# Patient Record
Sex: FEMALE | Race: WHITE | NOT HISPANIC OR LATINO | Employment: UNEMPLOYED | URBAN - METROPOLITAN AREA
[De-identification: names, ages, dates, MRNs, and addresses within clinical notes are randomized per-mention and may not be internally consistent; named-entity substitution may affect disease eponyms.]

---

## 2018-01-28 ENCOUNTER — OFFICE VISIT (OUTPATIENT)
Dept: URGENT CARE | Facility: PHYSICIAN GROUP | Age: 23
End: 2018-01-28
Payer: COMMERCIAL

## 2018-01-28 ENCOUNTER — HOSPITAL ENCOUNTER (OUTPATIENT)
Facility: MEDICAL CENTER | Age: 23
End: 2018-01-28
Attending: PHYSICIAN ASSISTANT
Payer: COMMERCIAL

## 2018-01-28 VITALS
BODY MASS INDEX: 28.35 KG/M2 | DIASTOLIC BLOOD PRESSURE: 82 MMHG | TEMPERATURE: 97.3 F | RESPIRATION RATE: 16 BRPM | WEIGHT: 160 LBS | SYSTOLIC BLOOD PRESSURE: 112 MMHG | HEIGHT: 63 IN | HEART RATE: 134 BPM | OXYGEN SATURATION: 97 %

## 2018-01-28 DIAGNOSIS — R11.2 NAUSEA AND VOMITING, INTRACTABILITY OF VOMITING NOT SPECIFIED, UNSPECIFIED VOMITING TYPE: Primary | ICD-10-CM

## 2018-01-28 DIAGNOSIS — Z3A.12 12 WEEKS GESTATION OF PREGNANCY: ICD-10-CM

## 2018-01-28 LAB
APPEARANCE UR: NORMAL
BILIRUB UR STRIP-MCNC: NORMAL MG/DL
COLOR UR AUTO: NORMAL
FLUAV+FLUBV AG SPEC QL IA: NORMAL
GLUCOSE UR STRIP.AUTO-MCNC: NORMAL MG/DL
INT CON NEG: NEGATIVE
INT CON POS: POSITIVE
KETONES UR STRIP.AUTO-MCNC: 160 MG/DL
LEUKOCYTE ESTERASE UR QL STRIP.AUTO: NORMAL
NITRITE UR QL STRIP.AUTO: NORMAL
PH UR STRIP.AUTO: 6 [PH] (ref 5–8)
PROT UR QL STRIP: 100 MG/DL
RBC UR QL AUTO: NORMAL
SP GR UR STRIP.AUTO: 1.03
UROBILINOGEN UR STRIP-MCNC: NORMAL MG/DL

## 2018-01-28 PROCEDURE — 87804 INFLUENZA ASSAY W/OPTIC: CPT | Performed by: PHYSICIAN ASSISTANT

## 2018-01-28 PROCEDURE — 87086 URINE CULTURE/COLONY COUNT: CPT

## 2018-01-28 PROCEDURE — 81002 URINALYSIS NONAUTO W/O SCOPE: CPT | Performed by: PHYSICIAN ASSISTANT

## 2018-01-28 PROCEDURE — 99204 OFFICE O/P NEW MOD 45 MIN: CPT | Performed by: PHYSICIAN ASSISTANT

## 2018-01-28 RX ORDER — ONDANSETRON 4 MG/1
4 TABLET, ORALLY DISINTEGRATING ORAL EVERY 6 HOURS PRN
Qty: 10 TAB | Refills: 0 | Status: SHIPPED | OUTPATIENT
Start: 2018-01-28 | End: 2018-01-29

## 2018-01-28 RX ORDER — ONDANSETRON 4 MG/1
4 TABLET, ORALLY DISINTEGRATING ORAL ONCE
Status: COMPLETED | OUTPATIENT
Start: 2018-01-28 | End: 2018-01-28

## 2018-01-28 RX ORDER — ONDANSETRON 4 MG/1
TABLET, FILM COATED ORAL
Refills: 0 | COMMUNITY
Start: 2018-01-17 | End: 2018-01-29

## 2018-01-28 RX ADMIN — ONDANSETRON 4 MG: 4 TABLET, ORALLY DISINTEGRATING ORAL at 12:13

## 2018-01-28 ASSESSMENT — ENCOUNTER SYMPTOMS
DIARRHEA: 0
COUGH: 1
VOMITING: 1
EYES NEGATIVE: 1
NAUSEA: 1
PSYCHIATRIC NEGATIVE: 1
MUSCULOSKELETAL NEGATIVE: 1
NEUROLOGICAL NEGATIVE: 1
ABDOMINAL PAIN: 0
CARDIOVASCULAR NEGATIVE: 1

## 2018-01-28 NOTE — PROGRESS NOTES
"Subjective:      Nery Spicer is a 22 y.o. female who presents with Emesis (nausea, vomiting; 12 weeks pregnant)            HPI  Chief Complaint   Patient presents with   • Emesis     nausea, vomiting; 12 weeks pregnant       HPI:  Nery Spicer is a 22 y.o. female who presents with 12 week pregnancy with n/v.  Patient denies HA, SOB, chest pain, palpitations, fever, chills, or n/v/d.    On vit B6.  Has not tried benadryl.  Did take Zofran last dose today, but threw right after.    This was rx by OB.    First pregnancy.     Mom present in room and had Nausea into second trimister.    No fever.  Having runny nose.  Last 2 days runny nose and more violent vomiting.      No past medical history on file.    No past surgical history on file.    No family history on file.  No pertinent family history.    Social History     Social History   • Marital status:      Spouse name: N/A   • Number of children: N/A   • Years of education: N/A     Occupational History   • Not on file.     Social History Main Topics   • Smoking status: Never Smoker   • Smokeless tobacco: Never Used   • Alcohol use Not on file   • Drug use: Unknown   • Sexual activity: Not on file     Other Topics Concern   • Not on file     Social History Narrative   • No narrative on file         Current Outpatient Prescriptions:   •  ondansetron,     No Known Allergies     Review of Systems   Constitutional: Positive for malaise/fatigue.   HENT: Positive for congestion.    Eyes: Negative.    Respiratory: Positive for cough.    Cardiovascular: Negative.    Gastrointestinal: Positive for nausea and vomiting. Negative for abdominal pain and diarrhea.   Genitourinary: Negative.    Musculoskeletal: Negative.    Skin: Negative.    Neurological: Negative.    Endo/Heme/Allergies: Negative.    Psychiatric/Behavioral: Negative.           Objective:     /82   Pulse (!) 134   Temp 36.3 °C (97.3 °F)   Resp 16   Ht 1.6 m (5' 3\")   Wt 72.6 kg (160 lb)   " SpO2 97%   BMI 28.34 kg/m²      Physical Exam       Nursing note and vitals reviewed.    Constitutional:  Appropriately groomed, pleasant affect, and in no acute distress.    Head: normocephalic and atraumatic.    Eye:   PERRLA, EOM's full, sclera white, no scleral icterus, conjunctiva not erythematous, and medial canthus without exudate bilaterally.    Ears:  Hearing grossly intact to voice.    Throat:  Oropharynx midly erythematous, with no enlargement of the palatine tonsils bilaterally with no exudates.    Posterior oropharynx with clear post nasal drainage present.  Soft palate rises symmetrically bilaterally and uvula midline.  Neck supple, with mild proximal anterior cervical chain lymphadenopathy that is soft and mobile to palpation.  Thyroid non-palpable.  No supraclavicular lymphadenopathy.    Lungs:  Lungs with normal respiratory excursion and effort. Lungs clear to auscultation bilaterally without wheezes rales or rhonchi.    Heart:  RRR, without murmurs, rubs, or gallops.  Carotid arteries without bruits bilaterally.  Radial and dorsalis pedis pulses 2+ bilaterally    Abdomen:  Protuberant without striations, ecchymosis, or lesions.  Bowel sounds present all 4Qs.  Normotympanic to percussion all 4Qs.  No  TTP.  No masses to palpation.  No hepatosplenomegaly, no TTP at McBurney's point, negative Gannon's sign, no rebound tenderness, and no guarding.  No CVA tenderness bilaterally.    MSK:  Gait and station wnl, non antalgic.    Derm:  No rashes or lesions with good turgor pressure.     Psychiatric:  Normal judgement, mood and affect.       Assessment/Plan:     1. Nausea and vomiting, intractability of vomiting not specified, unspecified vomiting type  ondansetron (ZOFRAN ODT) dispertab 4 mg    POCT Urinalysis    POCT Influenza A/B   2. 12 weeks gestation of pregnancy  ondansetron (ZOFRAN ODT) dispertab 4 mg    POCT Urinalysis    POCT Influenza A/B      Patient presents with nausea and vomiting that has  been worse over the last 2 days. Associate with runny nose and cough. Rapid flu negative. Urine with ketones and protein consistent with history of poor food intake and leukocyte esterase. Sent urine for culture for further evaluation to rule out urinary tract infection. Post Zofran ODT patient did have improvement with fluids and was able tolerate in clinic. She has been reporting 3-4 episodes of emesis daily which is increased from her normal and 2. Mother present in room also can attest to significant nausea and vomiting during pregnancy that lasted into second trimester. Patient is currently taking vitamin B6 which I advised her to continue with trial of Unisom over-the-counter and Zofran ODT if not improving. She has been prescribed this by her OB. I did advise that Zofran may have some possibilities for teratogenic effects and to use sparingly. Advised trial of Unisom (half tab) and B6 and lou. Obviously, if patient becomes significantly dehydrated this is more of a risk than poorly linked effects of Zofran with teratogenic effects.  ER if symptoms worsen.    Patient was in agreement with this treatment plan and seemed to understand without barriers. All questions were encouraged and answered.  Reviewed signs and symptoms of when to seek emergency medical care.     Please note that this dictation was created using voice recognition software.  I have made every reasonable attempt to correct obvious errors, but I expect there are errors of chantell and possibly content that I did not discover before finalizing the note.

## 2018-01-28 NOTE — PATIENT INSTRUCTIONS
Unisom: half tablet 1-2 times a day.  Continue with vitamin B6.  Geovanna chews and lozenges or tea.  Avoid stomach to be completely empty or too full.  Zofran under the tongue if not improving.      Morning Sickness  Morning sickness is when you feel sick to your stomach (nauseous) during pregnancy. This nauseous feeling may or may not come with vomiting. It often occurs in the morning but can be a problem any time of day. Morning sickness is most common during the first trimester, but it may continue throughout pregnancy. While morning sickness is unpleasant, it is usually harmless unless you develop severe and continual vomiting (hyperemesis gravidarum). This condition requires more intense treatment.   CAUSES   The cause of morning sickness is not completely known but seems to be related to normal hormonal changes that occur in pregnancy.  RISK FACTORS  You are at greater risk if you:  · Experienced nausea or vomiting before your pregnancy.  · Had morning sickness during a previous pregnancy.  · Are pregnant with more than one baby, such as twins.  TREATMENT   Do not use any medicines (prescription, over-the-counter, or herbal) for morning sickness without first talking to your health care provider. Your health care provider may prescribe or recommend:  · Vitamin B6 supplements.  · Anti-nausea medicines.  · The herbal medicine geovanna.  HOME CARE INSTRUCTIONS   · Only take over-the-counter or prescription medicines as directed by your health care provider.  · Taking multivitamins before getting pregnant can prevent or decrease the severity of morning sickness in most women.  · Eat a piece of dry toast or unsalted crackers before getting out of bed in the morning.  · Eat five or six small meals a day.  · Eat dry and bland foods (rice, baked potato). Foods high in carbohydrates are often helpful.  · Do not drink liquids with your meals. Drink liquids between meals.  · Avoid greasy, fatty, and spicy foods.  · Get  someone to cook for you if the smell of any food causes nausea and vomiting.  · If you feel nauseous after taking prenatal vitamins, take the vitamins at night or with a snack.   · Snack on protein foods (nuts, yogurt, cheese) between meals if you are hungry.  · Eat unsweetened gelatins for desserts.  · Wearing an acupressure wristband (worn for sea sickness) may be helpful.  · Acupuncture may be helpful.  · Do not smoke.  · Get a humidifier to keep the air in your house free of odors.  · Get plenty of fresh air.  SEEK MEDICAL CARE IF:   · Your home remedies are not working, and you need medicine.  · You feel dizzy or lightheaded.  · You are losing weight.  SEEK IMMEDIATE MEDICAL CARE IF:   · You have persistent and uncontrolled nausea and vomiting.  · You pass out (faint).  MAKE SURE YOU:  · Understand these instructions.  · Will watch your condition.  · Will get help right away if you are not doing well or get worse.     This information is not intended to replace advice given to you by your health care provider. Make sure you discuss any questions you have with your health care provider.     Document Released: 02/08/2008 Document Revised: 12/23/2014 Document Reviewed: 06/04/2014  Elsevier Interactive Patient Education ©2016 [employer]

## 2018-01-29 ENCOUNTER — HOSPITAL ENCOUNTER (INPATIENT)
Facility: MEDICAL CENTER | Age: 23
LOS: 8 days | DRG: 781 | End: 2018-02-07
Attending: EMERGENCY MEDICINE | Admitting: HOSPITALIST
Payer: COMMERCIAL

## 2018-01-29 DIAGNOSIS — E86.0 DEHYDRATION: ICD-10-CM

## 2018-01-29 DIAGNOSIS — O21.0 HYPEREMESIS GRAVIDARUM: ICD-10-CM

## 2018-01-29 DIAGNOSIS — Z3A.12 12 WEEKS GESTATION OF PREGNANCY: ICD-10-CM

## 2018-01-29 DIAGNOSIS — R11.2 NAUSEA AND VOMITING, INTRACTABILITY OF VOMITING NOT SPECIFIED, UNSPECIFIED VOMITING TYPE: ICD-10-CM

## 2018-01-29 DIAGNOSIS — Z34.91 FIRST TRIMESTER PREGNANCY: ICD-10-CM

## 2018-01-29 DIAGNOSIS — E87.29 HIGH ANION GAP METABOLIC ACIDOSIS: ICD-10-CM

## 2018-01-29 DIAGNOSIS — N39.0 ACUTE UTI: ICD-10-CM

## 2018-01-29 LAB
ALBUMIN SERPL BCP-MCNC: 5 G/DL (ref 3.2–4.9)
ALBUMIN/GLOB SERPL: 1.6 G/DL
ALP SERPL-CCNC: 68 U/L (ref 30–99)
ALT SERPL-CCNC: 31 U/L (ref 2–50)
ANION GAP SERPL CALC-SCNC: 14 MMOL/L (ref 0–11.9)
ANION GAP SERPL CALC-SCNC: 17 MMOL/L (ref 0–11.9)
APPEARANCE UR: ABNORMAL
AST SERPL-CCNC: 30 U/L (ref 12–45)
BACTERIA #/AREA URNS HPF: ABNORMAL /HPF
BASOPHILS # BLD AUTO: 0.3 % (ref 0–1.8)
BASOPHILS # BLD: 0.03 K/UL (ref 0–0.12)
BILIRUB SERPL-MCNC: 0.9 MG/DL (ref 0.1–1.5)
BILIRUB UR QL STRIP.AUTO: ABNORMAL
BUN SERPL-MCNC: 6 MG/DL (ref 8–22)
BUN SERPL-MCNC: 7 MG/DL (ref 8–22)
CALCIUM SERPL-MCNC: 10.4 MG/DL (ref 8.5–10.5)
CALCIUM SERPL-MCNC: 8.6 MG/DL (ref 8.5–10.5)
CHLORIDE SERPL-SCNC: 106 MMOL/L (ref 96–112)
CHLORIDE SERPL-SCNC: 113 MMOL/L (ref 96–112)
CO2 SERPL-SCNC: 14 MMOL/L (ref 20–33)
CO2 SERPL-SCNC: 15 MMOL/L (ref 20–33)
COLOR UR: ABNORMAL
CREAT SERPL-MCNC: 0.41 MG/DL (ref 0.5–1.4)
CREAT SERPL-MCNC: 0.56 MG/DL (ref 0.5–1.4)
CULTURE IF INDICATED INDCX: YES UA CULTURE
EOSINOPHIL # BLD AUTO: 0.01 K/UL (ref 0–0.51)
EOSINOPHIL NFR BLD: 0.1 % (ref 0–6.9)
EPI CELLS #/AREA URNS HPF: ABNORMAL /HPF
ERYTHROCYTE [DISTWIDTH] IN BLOOD BY AUTOMATED COUNT: 50.1 FL (ref 35.9–50)
GLOBULIN SER CALC-MCNC: 3.1 G/DL (ref 1.9–3.5)
GLUCOSE SERPL-MCNC: 95 MG/DL (ref 65–99)
GLUCOSE SERPL-MCNC: 95 MG/DL (ref 65–99)
GLUCOSE UR STRIP.AUTO-MCNC: NEGATIVE MG/DL
HCT VFR BLD AUTO: 49.3 % (ref 37–47)
HGB BLD-MCNC: 16.1 G/DL (ref 12–16)
HYALINE CASTS #/AREA URNS LPF: ABNORMAL /LPF
IMM GRANULOCYTES # BLD AUTO: 0.07 K/UL (ref 0–0.11)
IMM GRANULOCYTES NFR BLD AUTO: 0.6 % (ref 0–0.9)
KETONES UR STRIP.AUTO-MCNC: >=160 MG/DL
LEUKOCYTE ESTERASE UR QL STRIP.AUTO: ABNORMAL
LIPASE SERPL-CCNC: 68 U/L (ref 11–82)
LYMPHOCYTES # BLD AUTO: 0.91 K/UL (ref 1–4.8)
LYMPHOCYTES NFR BLD: 8.4 % (ref 22–41)
MCH RBC QN AUTO: 26.1 PG (ref 27–33)
MCHC RBC AUTO-ENTMCNC: 32.7 G/DL (ref 33.6–35)
MCV RBC AUTO: 80 FL (ref 81.4–97.8)
MICRO URNS: ABNORMAL
MONOCYTES # BLD AUTO: 0.57 K/UL (ref 0–0.85)
MONOCYTES NFR BLD AUTO: 5.2 % (ref 0–13.4)
NEUTROPHILS # BLD AUTO: 9.29 K/UL (ref 2–7.15)
NEUTROPHILS NFR BLD: 85.4 % (ref 44–72)
NITRITE UR QL STRIP.AUTO: NEGATIVE
NRBC # BLD AUTO: 0 K/UL
NRBC BLD-RTO: 0 /100 WBC
NUMBER OF RH DOSES IND 8505RD: NORMAL
PH UR STRIP.AUTO: 5.5 [PH]
PLATELET # BLD AUTO: 310 K/UL (ref 164–446)
PMV BLD AUTO: 11.1 FL (ref 9–12.9)
POTASSIUM SERPL-SCNC: 3.5 MMOL/L (ref 3.6–5.5)
POTASSIUM SERPL-SCNC: 4.1 MMOL/L (ref 3.6–5.5)
PROT SERPL-MCNC: 8.1 G/DL (ref 6–8.2)
PROT UR QL STRIP: 300 MG/DL
RBC # BLD AUTO: 6.16 M/UL (ref 4.2–5.4)
RBC # URNS HPF: ABNORMAL /HPF
RBC UR QL AUTO: ABNORMAL
RH BLD: NORMAL
SODIUM SERPL-SCNC: 137 MMOL/L (ref 135–145)
SODIUM SERPL-SCNC: 142 MMOL/L (ref 135–145)
SP GR UR STRIP.AUTO: 1.03
UROBILINOGEN UR STRIP.AUTO-MCNC: 1 MG/DL
WBC # BLD AUTO: 10.9 K/UL (ref 4.8–10.8)
WBC #/AREA URNS HPF: ABNORMAL /HPF

## 2018-01-29 PROCEDURE — 99285 EMERGENCY DEPT VISIT HI MDM: CPT

## 2018-01-29 PROCEDURE — 700105 HCHG RX REV CODE 258: Performed by: EMERGENCY MEDICINE

## 2018-01-29 PROCEDURE — 80053 COMPREHEN METABOLIC PANEL: CPT

## 2018-01-29 PROCEDURE — 87086 URINE CULTURE/COLONY COUNT: CPT

## 2018-01-29 PROCEDURE — 36415 COLL VENOUS BLD VENIPUNCTURE: CPT

## 2018-01-29 PROCEDURE — 83690 ASSAY OF LIPASE: CPT

## 2018-01-29 PROCEDURE — 80048 BASIC METABOLIC PNL TOTAL CA: CPT

## 2018-01-29 PROCEDURE — 85025 COMPLETE CBC W/AUTO DIFF WBC: CPT

## 2018-01-29 PROCEDURE — 96375 TX/PRO/DX INJ NEW DRUG ADDON: CPT

## 2018-01-29 PROCEDURE — 96361 HYDRATE IV INFUSION ADD-ON: CPT

## 2018-01-29 PROCEDURE — 96374 THER/PROPH/DIAG INJ IV PUSH: CPT

## 2018-01-29 PROCEDURE — 81001 URINALYSIS AUTO W/SCOPE: CPT

## 2018-01-29 PROCEDURE — 700111 HCHG RX REV CODE 636 W/ 250 OVERRIDE (IP): Performed by: EMERGENCY MEDICINE

## 2018-01-29 PROCEDURE — 86901 BLOOD TYPING SEROLOGIC RH(D): CPT

## 2018-01-29 RX ORDER — ONDANSETRON 2 MG/ML
4 INJECTION INTRAMUSCULAR; INTRAVENOUS ONCE
Status: COMPLETED | OUTPATIENT
Start: 2018-01-29 | End: 2018-01-29

## 2018-01-29 RX ORDER — PROMETHAZINE HYDROCHLORIDE 25 MG/1
25 SUPPOSITORY RECTAL EVERY 8 HOURS PRN
Qty: 15 SUPPOSITORY | Refills: 0 | Status: SHIPPED | OUTPATIENT
Start: 2018-01-29 | End: 2018-02-12

## 2018-01-29 RX ORDER — NITROFURANTOIN 25; 75 MG/1; MG/1
100 CAPSULE ORAL 2 TIMES DAILY
Qty: 20 CAP | Refills: 0 | Status: SHIPPED | OUTPATIENT
Start: 2018-01-29 | End: 2018-02-07

## 2018-01-29 RX ORDER — DEXTROSE MONOHYDRATE, SODIUM CHLORIDE, AND POTASSIUM CHLORIDE 50; 1.49; 2.25 G/1000ML; G/1000ML; G/1000ML
INJECTION, SOLUTION INTRAVENOUS CONTINUOUS
Status: DISCONTINUED | OUTPATIENT
Start: 2018-01-29 | End: 2018-01-30

## 2018-01-29 RX ORDER — PROMETHAZINE HYDROCHLORIDE 25 MG/1
25 TABLET ORAL EVERY 6 HOURS PRN
Qty: 30 TAB | Refills: 0 | Status: SHIPPED | OUTPATIENT
Start: 2018-01-29 | End: 2018-02-07

## 2018-01-29 RX ORDER — CEFTRIAXONE 1 G/1
1 INJECTION, POWDER, FOR SOLUTION INTRAMUSCULAR; INTRAVENOUS ONCE
Status: COMPLETED | OUTPATIENT
Start: 2018-01-29 | End: 2018-01-29

## 2018-01-29 RX ORDER — SODIUM CHLORIDE 9 MG/ML
1000 INJECTION, SOLUTION INTRAVENOUS ONCE
Status: COMPLETED | OUTPATIENT
Start: 2018-01-29 | End: 2018-01-29

## 2018-01-29 RX ADMIN — SODIUM CHLORIDE 1000 ML: 9 INJECTION, SOLUTION INTRAVENOUS at 21:29

## 2018-01-29 RX ADMIN — ONDANSETRON 4 MG: 2 INJECTION INTRAMUSCULAR; INTRAVENOUS at 21:06

## 2018-01-29 RX ADMIN — SODIUM CHLORIDE 1000 ML: 9 INJECTION, SOLUTION INTRAVENOUS at 20:07

## 2018-01-29 RX ADMIN — CEFTRIAXONE SODIUM 1 G: 1 INJECTION, POWDER, FOR SOLUTION INTRAMUSCULAR; INTRAVENOUS at 21:06

## 2018-01-29 ASSESSMENT — LIFESTYLE VARIABLES: DO YOU DRINK ALCOHOL: NO

## 2018-01-30 ENCOUNTER — RESOLUTE PROFESSIONAL BILLING HOSPITAL PROF FEE (OUTPATIENT)
Dept: HOSPITALIST | Facility: MEDICAL CENTER | Age: 23
End: 2018-01-30
Payer: COMMERCIAL

## 2018-01-30 PROBLEM — R11.2 INTRACTABLE NAUSEA AND VOMITING: Status: ACTIVE | Noted: 2018-01-30

## 2018-01-30 PROBLEM — E87.20 METABOLIC ACIDOSIS: Status: ACTIVE | Noted: 2018-01-30

## 2018-01-30 PROBLEM — E87.6 HYPOKALEMIA: Status: ACTIVE | Noted: 2018-01-30

## 2018-01-30 PROBLEM — Z34.90 PREGNANCY: Status: ACTIVE | Noted: 2018-01-30

## 2018-01-30 PROBLEM — O21.0 HYPEREMESIS GRAVIDARUM: Status: ACTIVE | Noted: 2018-01-30

## 2018-01-30 PROBLEM — D64.9 NORMOCYTIC ANEMIA: Status: ACTIVE | Noted: 2018-01-30

## 2018-01-30 PROBLEM — O23.40 UTI (URINARY TRACT INFECTION) DURING PREGNANCY: Status: ACTIVE | Noted: 2018-01-30

## 2018-01-30 PROBLEM — N39.0 URINARY TRACT INFECTION: Status: ACTIVE | Noted: 2018-01-30

## 2018-01-30 PROBLEM — R82.90 ABNORMAL URINALYSIS: Status: ACTIVE | Noted: 2018-01-30

## 2018-01-30 PROBLEM — E86.1 HYPOVOLEMIA DEHYDRATION: Status: ACTIVE | Noted: 2018-01-30

## 2018-01-30 LAB
APPEARANCE UR: CLEAR
BACTERIA #/AREA URNS HPF: ABNORMAL /HPF
BACTERIA UR CULT: NORMAL
BILIRUB UR QL STRIP.AUTO: NEGATIVE
COLOR UR: YELLOW
EPI CELLS #/AREA URNS HPF: ABNORMAL /HPF
EST. AVERAGE GLUCOSE BLD GHB EST-MCNC: 103 MG/DL
GLUCOSE UR STRIP.AUTO-MCNC: 100 MG/DL
HBA1C MFR BLD: 5.2 % (ref 0–5.6)
HYALINE CASTS #/AREA URNS LPF: ABNORMAL /LPF
KETONES UR STRIP.AUTO-MCNC: 80 MG/DL
LEUKOCYTE ESTERASE UR QL STRIP.AUTO: ABNORMAL
MAGNESIUM SERPL-MCNC: 1.5 MG/DL (ref 1.5–2.5)
MICRO URNS: ABNORMAL
NITRITE UR QL STRIP.AUTO: NEGATIVE
PH UR STRIP.AUTO: 5 [PH]
POTASSIUM SERPL-SCNC: 4 MMOL/L (ref 3.6–5.5)
PROT UR QL STRIP: NEGATIVE MG/DL
RBC # URNS HPF: ABNORMAL /HPF
RBC UR QL AUTO: NEGATIVE
SIGNIFICANT IND 70042: NORMAL
SITE SITE: NORMAL
SOURCE SOURCE: NORMAL
SP GR UR STRIP.AUTO: 1.01
UROBILINOGEN UR STRIP.AUTO-MCNC: 1 MG/DL
WBC #/AREA URNS HPF: ABNORMAL /HPF

## 2018-01-30 PROCEDURE — 700101 HCHG RX REV CODE 250: Performed by: EMERGENCY MEDICINE

## 2018-01-30 PROCEDURE — 96365 THER/PROPH/DIAG IV INF INIT: CPT

## 2018-01-30 PROCEDURE — 90471 IMMUNIZATION ADMIN: CPT

## 2018-01-30 PROCEDURE — 96376 TX/PRO/DX INJ SAME DRUG ADON: CPT

## 2018-01-30 PROCEDURE — 700101 HCHG RX REV CODE 250: Performed by: INTERNAL MEDICINE

## 2018-01-30 PROCEDURE — 99223 1ST HOSP IP/OBS HIGH 75: CPT | Performed by: INTERNAL MEDICINE

## 2018-01-30 PROCEDURE — 96366 THER/PROPH/DIAG IV INF ADDON: CPT

## 2018-01-30 PROCEDURE — 700102 HCHG RX REV CODE 250 W/ 637 OVERRIDE(OP): Performed by: INTERNAL MEDICINE

## 2018-01-30 PROCEDURE — 83036 HEMOGLOBIN GLYCOSYLATED A1C: CPT

## 2018-01-30 PROCEDURE — 3E0234Z INTRODUCTION OF SERUM, TOXOID AND VACCINE INTO MUSCLE, PERCUTANEOUS APPROACH: ICD-10-PCS | Performed by: HOSPITALIST

## 2018-01-30 PROCEDURE — 770020 HCHG ROOM/CARE - TELE (206)

## 2018-01-30 PROCEDURE — 700111 HCHG RX REV CODE 636 W/ 250 OVERRIDE (IP): Performed by: INTERNAL MEDICINE

## 2018-01-30 PROCEDURE — 81001 URINALYSIS AUTO W/SCOPE: CPT

## 2018-01-30 PROCEDURE — A9270 NON-COVERED ITEM OR SERVICE: HCPCS | Performed by: INTERNAL MEDICINE

## 2018-01-30 PROCEDURE — 96375 TX/PRO/DX INJ NEW DRUG ADDON: CPT

## 2018-01-30 PROCEDURE — 700105 HCHG RX REV CODE 258: Performed by: INTERNAL MEDICINE

## 2018-01-30 PROCEDURE — 84132 ASSAY OF SERUM POTASSIUM: CPT

## 2018-01-30 PROCEDURE — 700102 HCHG RX REV CODE 250 W/ 637 OVERRIDE(OP): Performed by: HOSPITALIST

## 2018-01-30 PROCEDURE — G0378 HOSPITAL OBSERVATION PER HR: HCPCS

## 2018-01-30 PROCEDURE — A9270 NON-COVERED ITEM OR SERVICE: HCPCS | Performed by: HOSPITALIST

## 2018-01-30 PROCEDURE — 700101 HCHG RX REV CODE 250: Performed by: HOSPITALIST

## 2018-01-30 PROCEDURE — 700111 HCHG RX REV CODE 636 W/ 250 OVERRIDE (IP): Performed by: HOSPITALIST

## 2018-01-30 PROCEDURE — 90686 IIV4 VACC NO PRSV 0.5 ML IM: CPT | Performed by: HOSPITALIST

## 2018-01-30 PROCEDURE — 83735 ASSAY OF MAGNESIUM: CPT

## 2018-01-30 RX ORDER — DEXTROSE AND SODIUM CHLORIDE 5; .9 G/100ML; G/100ML
INJECTION, SOLUTION INTRAVENOUS CONTINUOUS
Status: DISCONTINUED | OUTPATIENT
Start: 2018-01-30 | End: 2018-01-30

## 2018-01-30 RX ORDER — MORPHINE SULFATE 4 MG/ML
2 INJECTION, SOLUTION INTRAMUSCULAR; INTRAVENOUS
Status: DISCONTINUED | OUTPATIENT
Start: 2018-01-30 | End: 2018-02-07 | Stop reason: HOSPADM

## 2018-01-30 RX ORDER — ONDANSETRON 4 MG/1
4 TABLET, ORALLY DISINTEGRATING ORAL EVERY 4 HOURS PRN
Status: DISCONTINUED | OUTPATIENT
Start: 2018-01-30 | End: 2018-02-07 | Stop reason: HOSPADM

## 2018-01-30 RX ORDER — PROMETHAZINE HYDROCHLORIDE 25 MG/1
12.5-25 TABLET ORAL EVERY 4 HOURS PRN
Status: DISCONTINUED | OUTPATIENT
Start: 2018-01-30 | End: 2018-01-30

## 2018-01-30 RX ORDER — ACETAMINOPHEN 325 MG/1
650 TABLET ORAL EVERY 6 HOURS PRN
Status: DISCONTINUED | OUTPATIENT
Start: 2018-01-30 | End: 2018-02-07 | Stop reason: HOSPADM

## 2018-01-30 RX ORDER — MORPHINE SULFATE 4 MG/ML
1 INJECTION, SOLUTION INTRAMUSCULAR; INTRAVENOUS ONCE
Status: COMPLETED | OUTPATIENT
Start: 2018-01-30 | End: 2018-01-30

## 2018-01-30 RX ORDER — OXYCODONE HYDROCHLORIDE 5 MG/1
2.5 TABLET ORAL
Status: DISCONTINUED | OUTPATIENT
Start: 2018-01-30 | End: 2018-02-07 | Stop reason: HOSPADM

## 2018-01-30 RX ORDER — CEPHALEXIN 500 MG/1
500 CAPSULE ORAL 4 TIMES DAILY
Status: DISCONTINUED | OUTPATIENT
Start: 2018-01-30 | End: 2018-01-31

## 2018-01-30 RX ORDER — ONDANSETRON 2 MG/ML
4 INJECTION INTRAMUSCULAR; INTRAVENOUS EVERY 4 HOURS PRN
Status: DISCONTINUED | OUTPATIENT
Start: 2018-01-30 | End: 2018-02-07 | Stop reason: HOSPADM

## 2018-01-30 RX ORDER — ZOLPIDEM TARTRATE 5 MG/1
5 TABLET ORAL
Status: DISCONTINUED | OUTPATIENT
Start: 2018-01-30 | End: 2018-02-07 | Stop reason: HOSPADM

## 2018-01-30 RX ORDER — PROMETHAZINE HYDROCHLORIDE 12.5 MG/1
12.5-25 SUPPOSITORY RECTAL EVERY 4 HOURS PRN
Status: DISCONTINUED | OUTPATIENT
Start: 2018-01-30 | End: 2018-01-30

## 2018-01-30 RX ORDER — DEXTROSE MONOHYDRATE, SODIUM CHLORIDE, SODIUM LACTATE, POTASSIUM CHLORIDE, CALCIUM CHLORIDE 5; 600; 310; 179; 20 G/100ML; MG/100ML; MG/100ML; MG/100ML; MG/100ML
INJECTION, SOLUTION INTRAVENOUS CONTINUOUS
Status: DISCONTINUED | OUTPATIENT
Start: 2018-01-30 | End: 2018-01-30

## 2018-01-30 RX ORDER — PYRIDOXINE HCL (VITAMIN B6) 50 MG
50 TABLET ORAL DAILY
COMMUNITY

## 2018-01-30 RX ORDER — POLYETHYLENE GLYCOL 3350 17 G/17G
1 POWDER, FOR SOLUTION ORAL
Status: DISCONTINUED | OUTPATIENT
Start: 2018-01-30 | End: 2018-02-07 | Stop reason: HOSPADM

## 2018-01-30 RX ORDER — AMOXICILLIN 250 MG
2 CAPSULE ORAL 2 TIMES DAILY
Status: DISCONTINUED | OUTPATIENT
Start: 2018-01-30 | End: 2018-02-07 | Stop reason: HOSPADM

## 2018-01-30 RX ORDER — PROMETHAZINE HYDROCHLORIDE 25 MG/1
25 SUPPOSITORY RECTAL EVERY 6 HOURS PRN
Status: DISCONTINUED | OUTPATIENT
Start: 2018-01-30 | End: 2018-02-07 | Stop reason: HOSPADM

## 2018-01-30 RX ORDER — BISACODYL 10 MG
10 SUPPOSITORY, RECTAL RECTAL
Status: DISCONTINUED | OUTPATIENT
Start: 2018-01-30 | End: 2018-02-07 | Stop reason: HOSPADM

## 2018-01-30 RX ORDER — ONDANSETRON 4 MG/1
4 TABLET, ORALLY DISINTEGRATING ORAL EVERY 6 HOURS PRN
Status: ON HOLD | COMMUNITY
End: 2018-02-18

## 2018-01-30 RX ORDER — OXYCODONE HYDROCHLORIDE 5 MG/1
5 TABLET ORAL
Status: DISCONTINUED | OUTPATIENT
Start: 2018-01-30 | End: 2018-02-07 | Stop reason: HOSPADM

## 2018-01-30 RX ORDER — CLONIDINE HYDROCHLORIDE 0.1 MG/1
0.1 TABLET ORAL EVERY 6 HOURS PRN
Status: DISCONTINUED | OUTPATIENT
Start: 2018-01-30 | End: 2018-02-07 | Stop reason: HOSPADM

## 2018-01-30 RX ADMIN — POTASSIUM CHLORIDE: 2 INJECTION, SOLUTION, CONCENTRATE INTRAVENOUS at 09:21

## 2018-01-30 RX ADMIN — DEXTROSE MONOHYDRATE, SODIUM CHLORIDE, SODIUM LACTATE, POTASSIUM CHLORIDE, CALCIUM CHLORIDE: 5; 600; 310; 179; 20 INJECTION, SOLUTION INTRAVENOUS at 04:15

## 2018-01-30 RX ADMIN — ONDANSETRON 4 MG: 2 INJECTION INTRAMUSCULAR; INTRAVENOUS at 07:28

## 2018-01-30 RX ADMIN — DEXTROSE MONOHYDRATE, SODIUM CHLORIDE, AND POTASSIUM CHLORIDE 1000 ML: 50; 2.25; 1.49 INJECTION, SOLUTION INTRAVENOUS at 00:34

## 2018-01-30 RX ADMIN — PROMETHAZINE HYDROCHLORIDE 25 MG: 12.5 SUPPOSITORY RECTAL at 16:14

## 2018-01-30 RX ADMIN — INFLUENZA A VIRUS A/MICHIGAN/45/2015 X-275 (H1N1) ANTIGEN (FORMALDEHYDE INACTIVATED), INFLUENZA A VIRUS A/HONG KONG/4801/2014 X-263B (H3N2) ANTIGEN (FORMALDEHYDE INACTIVATED), INFLUENZA B VIRUS B/PHUKET/3073/2013 ANTIGEN (FORMALDEHYDE INACTIVATED), AND INFLUENZA B VIRUS B/BRISBANE/60/2008 ANTIGEN (FORMALDEHYDE INACTIVATED) 0.5 ML: 15; 15; 15; 15 INJECTION, SUSPENSION INTRAMUSCULAR at 12:54

## 2018-01-30 RX ADMIN — CEPHALEXIN 500 MG: 500 CAPSULE ORAL at 18:48

## 2018-01-30 RX ADMIN — ONDANSETRON 4 MG: 2 INJECTION INTRAMUSCULAR; INTRAVENOUS at 12:54

## 2018-01-30 RX ADMIN — STANDARDIZED SENNA CONCENTRATE AND DOCUSATE SODIUM 2 TABLET: 8.6; 5 TABLET, FILM COATED ORAL at 20:54

## 2018-01-30 RX ADMIN — CEPHALEXIN 500 MG: 500 CAPSULE ORAL at 20:54

## 2018-01-30 RX ADMIN — MORPHINE SULFATE 1 MG: 4 INJECTION INTRAVENOUS at 13:10

## 2018-01-30 RX ADMIN — PROMETHAZINE HYDROCHLORIDE 25 MG: 12.5 SUPPOSITORY RECTAL at 09:23

## 2018-01-30 ASSESSMENT — ENCOUNTER SYMPTOMS
ABDOMINAL PAIN: 0
LOSS OF CONSCIOUSNESS: 0
INSOMNIA: 0
FALLS: 0
FOCAL WEAKNESS: 0
CONSTIPATION: 0
WHEEZING: 0
EYE PAIN: 0
CHILLS: 0
ABDOMINAL PAIN: 1
WEAKNESS: 1
TREMORS: 0
DOUBLE VISION: 0
SEIZURES: 0
MYALGIAS: 0
BLOOD IN STOOL: 0
HEMOPTYSIS: 0
NERVOUS/ANXIOUS: 0
SPUTUM PRODUCTION: 0
PALPITATIONS: 0
NAUSEA: 1
EYE REDNESS: 0
WEAKNESS: 0
COUGH: 0
FEVER: 0
SHORTNESS OF BREATH: 0
HEADACHES: 0
DIARRHEA: 0
DIZZINESS: 0
VOMITING: 1
BLURRED VISION: 0

## 2018-01-30 ASSESSMENT — PAIN SCALES - GENERAL
PAINLEVEL_OUTOF10: 0
PAINLEVEL_OUTOF10: 10

## 2018-01-30 ASSESSMENT — PATIENT HEALTH QUESTIONNAIRE - PHQ9
1. LITTLE INTEREST OR PLEASURE IN DOING THINGS: NOT AT ALL
2. FEELING DOWN, DEPRESSED, IRRITABLE, OR HOPELESS: NOT AT ALL
SUM OF ALL RESPONSES TO PHQ9 QUESTIONS 1 AND 2: 0
SUM OF ALL RESPONSES TO PHQ QUESTIONS 1-9: 0

## 2018-01-30 ASSESSMENT — LIFESTYLE VARIABLES
EVER_SMOKED: NEVER
ALCOHOL_USE: NO

## 2018-01-30 NOTE — PROGRESS NOTES
"Pt c/o 10/10 chest pain, \"when I hiccup.\" Discussed pt's care with Hospitalist. Pt medicated with 1 mg IV morphine x 1.   "

## 2018-01-30 NOTE — PROGRESS NOTES
Received care of pt from NOC RN this am. Pt is A+O. Emesis x 2 this am. Medicated w/ IV PRN Zofran. Discussed pt's care with Hospitalist.

## 2018-01-30 NOTE — ASSESSMENT & PLAN NOTE
1st pregnancy. Update outpatient Dr. Treva turner [phone]    Abd US 2/3/18-  Single intrauterine pregnancy of an estimated gestational age of 13 weeks, 2 days with an estimated date of delivery of 8/8/2018. No clinical AMINA is noted.  Fetal survey within normal limits.  No perigestational hemorrhage or free fluid.

## 2018-01-30 NOTE — H&P
Hospital Medicine History and Physical    Date of Service  2018    Chief Complaint  Chief Complaint   Patient presents with   • N/V     pt has had n/v onset approx 5 days ago and worsening every day since.  Pt was seen at Urgent Care yesterday and given RX for zofran which is not helping at all.   • Pregnancy     LMP 17,  AMINA 19,         History of Presenting Illness  22 y.o. female with no prior medical history but recently gravid, reports that since she has found out about her pregnancy, she has had persistent nausea and vomiting. [place] She reports that she was given a prescription for Zofran, and this helps, however approximately 5 days prior to admission, she noted that Zofran was no longer helping, and she had persistent nausea and vomiting. She reports inability to intake any food due to vomiting. She feels somewhat lightheaded due to her vomiting, otherwise she has no headache, vision changes, chest pain, shortness of breath, abdominal pain, diarrhea or constipation. She has no other complaints.    Primary Care Physician  Pcp Pt States None    Consultants  none    Code Status  Full code     Review of Systems  Review of Systems   Constitutional: Negative for chills and fever.   Eyes: Negative for blurred vision and double vision.   Respiratory: Negative for cough, sputum production and shortness of breath.    Cardiovascular: Negative for chest pain and palpitations.   Gastrointestinal: Positive for nausea and vomiting. Negative for abdominal pain, constipation and diarrhea.   Genitourinary: Negative for dysuria.   Neurological: Positive for weakness. Negative for headaches.        Past Medical History  No past medical history on file.    Surgical History  No past surgical history on file.    Medications  No current facility-administered medications on file prior to encounter.      No current outpatient prescriptions on file prior to  encounter.   Taking Zofran as needed prior to admission    Family History  History reviewed. No pertinent family history.    Social History  Social History   Substance Use Topics   • Smoking status: Never Smoker   • Smokeless tobacco: Never Used   • Alcohol use No       Allergies  No Known Allergies     Physical Exam  Laboratory   Hemodynamics  Temp (24hrs), Av.3 °C (97.4 °F), Min:36.1 °C (97 °F), Max:36.6 °C (97.8 °F)   Temperature: 36.3 °C (97.3 °F)  Pulse  Av.8  Min: 82  Max: 124 Heart Rate (Monitored): 95  Blood Pressure: 116/65, NIBP: 113/67      Respiratory      Respiration: 16, Pulse Oximetry: 98 %             Physical Exam   Constitutional: She is oriented to person, place, and time. She appears well-developed and well-nourished. She appears distressed.   HENT:   Head: Normocephalic and atraumatic.   Eyes: Pupils are equal, round, and reactive to light.   Neck: Normal range of motion. Neck supple.   Cardiovascular: Normal rate, regular rhythm and normal heart sounds.  Exam reveals no gallop and no friction rub.    No murmur heard.  Pulmonary/Chest: Effort normal and breath sounds normal. No respiratory distress. She has no wheezes. She has no rales.   Abdominal: Soft. Bowel sounds are normal. She exhibits no distension. There is no tenderness. There is no rebound.   Musculoskeletal: Normal range of motion. She exhibits no edema.   Neurological: She is alert and oriented to person, place, and time. No cranial nerve deficit.   Skin: Skin is warm and dry. She is not diaphoretic.   Psychiatric: She has a normal mood and affect.   Nursing note and vitals reviewed.      Recent Labs      18   WBC  10.9*   RBC  6.16*   HEMOGLOBIN  16.1*   HEMATOCRIT  49.3*   MCV  80.0*   MCH  26.1*   MCHC  32.7*   RDW  50.1*   PLATELETCT  310   MPV  11.1     Recent Labs      18   2251   SODIUM  137  142   POTASSIUM  4.1  3.5*   CHLORIDE  106  113*   CO2  14*  15*   GLUCOSE  95  95   BUN   7*  6*   CREATININE  0.56  0.41*   CALCIUM  10.4  8.6     Recent Labs      01/29/18 2007 01/29/18   2251   ALTSGPT  31   --    ASTSGOT  30   --    ALKPHOSPHAT  68   --    TBILIRUBIN  0.9   --    LIPASE  68   --    GLUCOSE  95  95                 No results found for: TROPONINI  Urinalysis:    Lab Results  Component Value Date/Time   SPECGRAVITY 1.033 01/29/2018 2007   GLUCOSEUR Negative 01/29/2018 2007   KETONES >=160 01/29/2018 2007   NITRITE Negative 01/29/2018 2007   WBCURINE Packed WBC 01/29/2018 2007   RBCURINE 0-2 01/29/2018 2007   BACTERIA Many (A) 01/29/2018 2007   EPITHELCELL Many (A) 01/29/2018 2007        Imaging  none   Assessment/Plan     I anticipate this patient is appropriate for observation status at this time.    * Hyperemesis gravidarum   Assessment & Plan    Continue antiemetics as needed.  Fluid resuscitation.        Metabolic acidosis   Assessment & Plan    Likely a combination of starvation and intractable vomiting. Monitor. Intravenous fluids for hydration, given concurrent hypokalemia, will administer lactated Ringer's supplemented with potassium        Pregnancy   Assessment & Plan    1st trimester of 1st pregnancy.        Normocytic anemia   Assessment & Plan    Likely dilutional in the setting of pregnancy.            VTE prophylaxis: SCD.

## 2018-01-30 NOTE — PROGRESS NOTES
Transferred pt from ER, received report. AOx 4. Fluids D5, 1/2 NS w 20 KCl runs at 125 ml/hr . N/v subsided. No complains of pain. Plan of care discussed safety, fluids, abx?, control N/V and pt understands.

## 2018-01-30 NOTE — ED TRIAGE NOTES
"Nery Spicer 22 y.o. female ambulatory to triage with family for     Chief Complaint   Patient presents with   • N/V     pt has had n/v onset approx 5 days ago and worsening every day since.  Pt was seen at Urgent Care yesterday and given RX for zofran which is not helping at all.   • Pregnancy     LMP 17,  AMINA 19,       Pt has approx 200cc emesis disposed of and new emesis bag provided.  Pt tachycardic, otherwise VSS.  /92   Pulse (!) 124   Temp 36.1 °C (97 °F)   Resp 18   Ht 1.6 m (5' 3\")   Wt 71.8 kg (158 lb 4.6 oz)   SpO2 98%   BMI 28.04 kg/m²   Pt directed to the lobby to await bed assignment.  Advised to return to triage desk for any changes/concerns.  "

## 2018-01-30 NOTE — ASSESSMENT & PLAN NOTE
Discussed with her outpatient ObGyn who explained that it typically resolves after the 16th week   - improved, ok for d/c

## 2018-01-30 NOTE — ED PROVIDER NOTES
ED Provider Note    Scribed for Fabi Cruz M.D. by Ilda Almodovar. 2018  7:30 PM    Primary care provider: Pcp Pt States None  Means of arrival: Walk-in  History obtained from: Patient  History limited by: None     CHIEF COMPLAINT  Chief Complaint   Patient presents with   • N/V     pt has had n/v onset approx 5 days ago and worsening every day since.  Pt was seen at Urgent Care yesterday and given RX for zofran which is not helping at all.   • Pregnancy     LMP 17,  AMINA 19,       HPI  Nery Spicer is a 22 y.o. female who presents to the Emergency Department for nausea and vomiting onset five days ago. Patient states she is currently approximately 12 weeks pregnant. Per mother, the patient has been unable to keep any fluids down and has been unable to eat. Other symptoms include dizziness and soreness to the epigastric region from her episodes of vomiting. This is the patient's first pregnancy. Patient's mother is concerned the patient is dehydrated. Patient was evaluated at Urgent Care yesterday and was prescribed Zofran without improvement. Per nurses note, the patient's last menstrual period was 17. Patient denies any vaginal bleeding or lower abdominal pain. Patient currently is visiting her mother from [place]. She is receiving prenatal care in [place].     REVIEW OF SYSTEMS  CARDIAC: no chest pain  GI: positive for vomiting and epigastric pain.   : no dysuria or vaginal bleeding.   Neuro: positive for dizziness. No weakness or numbness.   Endocrine: no fevers  SKIN: no rash  See history of present illness. All other systems are negative  C.     PAST MEDICAL HISTORY   No past medical history reported.     SURGICAL HISTORY  patient denies any surgical history    SOCIAL HISTORY  Social History   Substance Use Topics   • Smoking status: Never Smoker   • Smokeless tobacco: Never Used       FAMILY HISTORY  None noted during this encounter.     CURRENT MEDICATIONS  No current  "medications reported.     ALLERGIES  No Known Allergies    PHYSICAL EXAM  VITAL SIGNS: /92   Pulse (!) 124   Temp 36.1 °C (97 °F)   Resp 18   Ht 1.6 m (5' 3\")   Wt 71.8 kg (158 lb 4.6 oz)   SpO2 98%   BMI 28.04 kg/m²     Constitutional: Well developed, Well nourished, No acute distress, Non-toxic appearance.   HEENT: Normocephalic, Atraumatic,  external ears normal, pharynx pink. Dry mucous membranes. No rhinorrhea or mucosal edema  Eyes: PERRL, EOMI, Conjunctiva normal, No discharge.   Neck: Normal range of motion, No tenderness, Supple, No stridor.   Lymphatic: No lymphadenopathy    Cardiovascular: Tachycardic. Regular Rhythm, No murmurs,  rubs, or gallops.   Thorax & Lungs: Lungs clear to auscultation bilaterally, No respiratory distress, No wheezes, rhales or rhonchi, No chest wall tenderness.   Abdomen: epigastric tenderness from vomiting. No tenderness to the lower abdomen or suprapubic tenderness. Soft. non distended,  No pulsatile masses., no rebound guarding or peritoneal signs.   Skin: Warm, Dry, No erythema, No rash,   Back:  No CVA tenderness,  No spinal tenderness, bony crepitance, step offs, or instability.   Neurologic: Alert & oriented x 3, Normal motor function, Normal sensory function, No focal deficits noted. Normal reflexes. Normal Cranial Nerves.  Extremities: Equal, intact distal pulses, No cyanosis, clubbing or edema,  No tenderness.   Musculoskeletal: Good range of motion in all major joints. No tenderness to palpation or major deformities noted.     DIAGNOSTIC STUDIES / PROCEDURES    LABS  Results for orders placed or performed during the hospital encounter of 01/29/18   CBC WITH DIFFERENTIAL   Result Value Ref Range    WBC 10.9 (H) 4.8 - 10.8 K/uL    RBC 6.16 (H) 4.20 - 5.40 M/uL    Hemoglobin 16.1 (H) 12.0 - 16.0 g/dL    Hematocrit 49.3 (H) 37.0 - 47.0 %    MCV 80.0 (L) 81.4 - 97.8 fL    MCH 26.1 (L) 27.0 - 33.0 pg    MCHC 32.7 (L) 33.6 - 35.0 g/dL    RDW 50.1 (H) 35.9 - 50.0 " fL    Platelet Count 310 164 - 446 K/uL    MPV 11.1 9.0 - 12.9 fL    Neutrophils-Polys 85.40 (H) 44.00 - 72.00 %    Lymphocytes 8.40 (L) 22.00 - 41.00 %    Monocytes 5.20 0.00 - 13.40 %    Eosinophils 0.10 0.00 - 6.90 %    Basophils 0.30 0.00 - 1.80 %    Immature Granulocytes 0.60 0.00 - 0.90 %    Nucleated RBC 0.00 /100 WBC    Neutrophils (Absolute) 9.29 (H) 2.00 - 7.15 K/uL    Lymphs (Absolute) 0.91 (L) 1.00 - 4.80 K/uL    Monos (Absolute) 0.57 0.00 - 0.85 K/uL    Eos (Absolute) 0.01 0.00 - 0.51 K/uL    Baso (Absolute) 0.03 0.00 - 0.12 K/uL    Immature Granulocytes (abs) 0.07 0.00 - 0.11 K/uL    NRBC (Absolute) 0.00 K/uL   COMP METABOLIC PANEL   Result Value Ref Range    Sodium 137 135 - 145 mmol/L    Potassium 4.1 3.6 - 5.5 mmol/L    Chloride 106 96 - 112 mmol/L    Co2 14 (L) 20 - 33 mmol/L    Anion Gap 17.0 (H) 0.0 - 11.9    Glucose 95 65 - 99 mg/dL    Bun 7 (L) 8 - 22 mg/dL    Creatinine 0.56 0.50 - 1.40 mg/dL    Calcium 10.4 8.5 - 10.5 mg/dL    AST(SGOT) 30 12 - 45 U/L    ALT(SGPT) 31 2 - 50 U/L    Alkaline Phosphatase 68 30 - 99 U/L    Total Bilirubin 0.9 0.1 - 1.5 mg/dL    Albumin 5.0 (H) 3.2 - 4.9 g/dL    Total Protein 8.1 6.0 - 8.2 g/dL    Globulin 3.1 1.9 - 3.5 g/dL    A-G Ratio 1.6 g/dL   LIPASE   Result Value Ref Range    Lipase 68 11 - 82 U/L   URINALYSIS CULTURE, IF INDICATED   Result Value Ref Range    Color DK Yellow     Character Turbid (A)     Specific Gravity 1.033 <1.035    Ph 5.5 5.0 - 8.0    Glucose Negative Negative mg/dL    Ketones >=160 Negative mg/dL    Protein 300 (A) Negative mg/dL    Bilirubin Moderate (A) Negative    Urobilinogen, Urine 1.0 Negative    Nitrite Negative Negative    Leukocyte Esterase Moderate (A) Negative    Occult Blood Trace (A) Negative    Micro Urine Req Microscopic     Culture Indicated Yes UA Culture   RH TYPE FOR RHOGAM FROM E.D.   Result Value Ref Range    Emergency Department Rh Typing POS     Number Of Rh Doses Indicated ZERO    URINE MICROSCOPIC (W/UA)    Result Value Ref Range    WBC Packed WBC /hpf    RBC 0-2 /hpf    Bacteria Many (A) None /hpf    Epithelial Cells Many (A) /hpf    Hyaline Cast 6-10 (A) /lpf   ESTIMATED GFR   Result Value Ref Range    GFR If African American >60 >60 mL/min/1.73 m 2    GFR If Non African American >60 >60 mL/min/1.73 m 2      All labs reviewed by me.    EKG  None.     RADIOLOGY  Bedside ultrasound performed. Fetal cardiac activity seen on ultrasound.     COURSE & MEDICAL DECISION MAKING  Nursing notes, VS, PMSFHx reviewed in chart.    7:30 PM - Patient seen and examined at bedside. Patient will be treated with IV fluids secondary to history of vomiting and dry mucous membranes. Ordered 4 mg Zofran. Ordered urinalysis culture, RH for rhogam, CBC, CMP and lipase to evaluate her symptoms. The differential diagnoses include but are not limited to: UTI, pyelnophritis, hyperemesis    8:06 PM Performed ultrasound at bedside. Evidence of fetal cardiac activity was shown.     9:19 PM I ordered IV Rocephin to treat the patient's urinary tract infection. I also ordered another liter of fluid because she is very dehydrated. I gave her some apple juice, which she is sipping without vomiting. She will be watched in the emergency department until these interventions are complete. She will then be reevaluated by Dr. De La Rosa admit her if she is not improved and discharge her home if she is. Evidence for Macrobid and Phenergan are written.    The patient will return for new or worsening symptoms and is stable at the time of discharge.    DISPOSITION:  Patient will be discharged home in stable condition.    FOLLOW UP:  [facility], Emergency Dept  [address] [address] [phone]    As needed, If symptoms worsen    Current Outpatient Prescriptions   Medication Sig Dispense Refill   • promethazine (PHENERGAN) 25 MG Suppos Insert 1 Suppository in rectum every 8 hours as needed for Nausea/Vomiting for up to 15  doses. 15 Suppository 0   • promethazine (PHENERGAN) 25 MG Tab Take 1 Tab by mouth every 6 hours as needed for Nausea/Vomiting. 30 Tab 0   • nitrofurantoin monohydr macro (MACROBID) 100 MG Cap Take 1 Cap by mouth 2 times a day for 10 days. 20 Cap 0         FINAL IMPRESSION  1. Acute UTI    2. Hyperemesis gravidarum          Ilda BAER (Scribe), am scribing for, and in the presence of, Fabi Cruz M.D..    Electronically signed by: Ilda Almodovar (Scribe), 1/29/2018    IFabi M.D. personally performed the services described in this documentation, as scribed by Ilda Almodovar in my presence, and it is both accurate and complete.    The note accurately reflects work and decisions made by me.  Fabi Cruz  1/29/2018  9:20 PM

## 2018-01-30 NOTE — H&P
Hospital Medicine History and Physical    Date of Service  2018    Chief Complaint  Chief Complaint   Patient presents with   • N/V     pt has had n/v onset approx 5 days ago and worsening every day since.  Pt was seen at Urgent Care yesterday and given RX for zofran which is not helping at all.   • Pregnancy     LMP 17,  AMINA 19,         History of Presenting Illness  22 y.o. female 1st trimester ptregnancy who presented 2018 with N/V (pt has had n/v onset approx 5 days ago and worsening every day since.  Pt was seen at Urgent Care yesterday and given RX for zofran which is not helping at all.) and Pregnancy (LMP 17,  AMINA 19,  )  Please see Dr. Ortega's observation HnP for details.  Basically she has a prelim diagnosis of hyperemesis gravidarum and she continues to vomit up her food. I also reviewed her chart and she had a urinalysis that was a dirty catch but had packed red blood cells. I called her ObGyn who gave recommendations.    Primary Care Physician  Pcp Pt States None    Consultants      Code Status  full    Review of Systems  Review of Systems   Constitutional: Negative for chills and fever.   HENT: Negative for congestion, hearing loss and nosebleeds.    Eyes: Negative for pain and redness.   Respiratory: Negative for cough, hemoptysis, shortness of breath and wheezing.    Cardiovascular: Negative for chest pain and palpitations.   Gastrointestinal: Positive for abdominal pain, nausea and vomiting. Negative for blood in stool, constipation and diarrhea.   Genitourinary: Negative for dysuria, frequency and hematuria.   Musculoskeletal: Negative for falls, joint pain and myalgias.   Skin: Negative for rash.   Neurological: Negative for dizziness, tremors, focal weakness, seizures, loss of consciousness, weakness and headaches.   Psychiatric/Behavioral: The patient is not nervous/anxious and does not have insomnia.    All other systems reviewed and are negative.        Past Medical History  No past medical history on file.  Pregnancy  Surgical History  No past surgical history on file.    Medications  No current facility-administered medications on file prior to encounter.      No current outpatient prescriptions on file prior to encounter.       Family History  History reviewed. No pertinent family history.    Social History  Social History   Substance Use Topics   • Smoking status: Never Smoker   • Smokeless tobacco: Never Used   • Alcohol use No       Allergies  No Known Allergies     Physical Exam  Laboratory   Hemodynamics  Temp (24hrs), Av.3 °C (97.4 °F), Min:36.1 °C (97 °F), Max:36.6 °C (97.8 °F)   Temperature: 36.4 °C (97.5 °F)  Pulse  Av.8  Min: 82  Max: 124 Heart Rate (Monitored): 95  Blood Pressure: 141/75, NIBP: 113/67      Respiratory      Respiration: 18, Pulse Oximetry: 98 %             Physical Exam   Constitutional: She appears well-developed and well-nourished.   Malaised   HENT:   Head: Normocephalic and atraumatic.   Eyes: Conjunctivae and EOM are normal. No scleral icterus.   Neck: Normal range of motion. Neck supple.   Cardiovascular: Normal rate and regular rhythm.  Exam reveals no gallop and no friction rub.    No murmur heard.  Pulmonary/Chest: Effort normal and breath sounds normal. No respiratory distress. She has no wheezes. She has no rales.   Abdominal: Soft. Bowel sounds are normal. She exhibits no distension. There is no tenderness. There is no rebound and no guarding.   Musculoskeletal: She exhibits no edema or tenderness.   Neurological: She is alert.   Skin: Skin is warm.   Psychiatric: She has a normal mood and affect. Her behavior is normal.       Recent Labs      18   WBC  10.9*   RBC  6.16*   HEMOGLOBIN  16.1*   HEMATOCRIT  49.3*   MCV  80.0*   MCH  26.1*   MCHC  32.7*   RDW  50.1*   PLATELETCT  310   MPV  11.1     Recent Labs      18   2251   SODIUM  137  142   POTASSIUM  4.1  3.5*   CHLORIDE   106  113*   CO2  14*  15*   GLUCOSE  95  95   BUN  7*  6*   CREATININE  0.56  0.41*   CALCIUM  10.4  8.6     Recent Labs      01/29/18 2007 01/29/18   2251   ALTSGPT  31   --    ASTSGOT  30   --    ALKPHOSPHAT  68   --    TBILIRUBIN  0.9   --    LIPASE  68   --    GLUCOSE  95  95                 No results found for: TROPONINI  Urinalysis:    Lab Results  Component Value Date/Time   SPECGRAVITY 1.033 01/29/2018 2007   GLUCOSEUR Negative 01/29/2018 2007   KETONES >=160 01/29/2018 2007   NITRITE Negative 01/29/2018 2007   WBCURINE Packed WBC 01/29/2018 2007   RBCURINE 0-2 01/29/2018 2007   BACTERIA Many (A) 01/29/2018 2007   EPITHELCELL Many (A) 01/29/2018 2007        Imaging  No results found.   Assessment/Plan     I anticipate this patient will require at least two midnights for appropriate medical management, necessitating inpatient admission.    * Hyperemesis gravidarum   Assessment & Plan    Continue antiemetics as needed.  Fluid resuscitation.  1/30. She continues to have nausea, vomiting and dehydration. Potassium dropping. Ordered magnesium level. Mainstay of treatment is gut rest, antiemetics (if more intractable consider steroids) and replacement of electrolytes, multivitamins, thiamine. Transfer to inpatient as this will take more than 2 midnights.  Called Amaya Hoffman for recommendations  Ordered phenergan suppositories.        Hypovolemia dehydration   Assessment & Plan    From  Vomiting  Will do rally bag        UTI (urinary tract infection) during pregnancy   Assessment & Plan    1/30. Has urinalysis that was a dirty catch but did have packed white blood cells. Repeat urinalysis and will get urine culture  If U/A positive for UTI then recommendation is to treat  Called her ObGyn Dr. Tilley  Urinalysis turned out positive for UTI  Spoke with Pharmacy. Treat with Keflex          Metabolic acidosis   Assessment & Plan    Likely a combination of starvation and intractable vomiting.  Monitor. Intravenous fluids for hydration, given concurrent hypokalemia, will administer lactated Ringer's supplemented with potassium  Transfer to inpatient as this will take more than 2 midnights and require monitoring        Pregnancy   Assessment & Plan    1st trimester of 1st pregnancy.  Called Dr. Tilley, ObGyn [phone]        Hypokalemia   Assessment & Plan    Replace        Normocytic anemia   Assessment & Plan    Likely dilutional in the setting of pregnancy.  No active bleeding  Monitor          VTE prophylaxis: SCD.    I spent 72 minutes, reviewing the chart, notes, vitals, labs, imaging, ordering labs, evaluating Nery Spicer for assessment, enacting the plan above. 50% of the time was spent in counseling Nery Spicer andanswering questions. Discussed with Dr. Tilley. Reassured patient. Medical decision making is therefore complex. Time was devoted to counseling and coordinating care including review of records, pertinent lab data and studies, as well as discussing diagnostic evaluation and work up, planned therapeutic interventions and future disposition of care. Where indicated, the assessment and plan reflect discussion of patient with consultants, other healthcare providers, family members, and additional research needed to obtain further information in formulating the plan of care for Nery Spicer.

## 2018-01-30 NOTE — ED NOTES
Pt claims N/V and tiredness with pregnancy. Ambulated to restroom and back with steady gait for urine specimen

## 2018-01-30 NOTE — DISCHARGE PLANNING
Care Transition Team Assessment    Information Source  Orientation : Oriented x 4  Information Given By: Patient  Informant's Name:  (Nery)  Who is responsible for making decisions for patient? : Patient    Readmission Evaluation  Is this a readmission?: No    Elopement Risk  Legal Hold: No  Ambulatory or Self Mobile in Wheelchair: No-Not an Elopement Risk    Interdisciplinary Discharge Planning  Does Admitting Nurse Feel This Could be a Complex Discharge?: No  Primary Care Physician:  (pt states has an OB/GYN in [place], visiting mom currently)  Lives with - Patient's Self Care Capacity: Spouse  Support Systems: Family Member(s)  Housing / Facility: 2 Story Apartment / Condo  Do You Take your Prescribed Medications Regularly: No  Reasons Why Not Taking Medications :  (no previous medications prescribed)  Able to Return to Previous ADL's: Yes  Prior Services: None  Patient Expects to be Discharged to:: mom's home  Assistance Needed: Unknown at this Time  Durable Medical Equipment: Not Applicable    Discharge Preparedness  Prior Functional Level: Ambulatory, Independent with Activities of Daily Living    Functional Assesment  Prior Functional Level: Ambulatory, Independent with Activities of Daily Living    Finances  Financial Barriers to Discharge: Yes  Average Monthly Income:  (husbands salary)  Prescription Coverage: Yes    Vision / Hearing Impairment  Vision Impairment :  (wears glasses)  Hearing Impairment : No    Values / Beliefs / Concerns  Values / Beliefs Concerns : No    Advance Directive  Advance Directive?: None  Advance Directive offered?: AD Booklet refused    Domestic Abuse  Have you ever been the victim of abuse or violence?: No  Physical Abuse or Sexual Abuse: No    Psychological Assessment  History of Substance Abuse: None  History of Psychiatric Problems: No    Discharge Risks or Barriers  Discharge risks or barriers?: No PCP (local)  Patient risk factors: No PCP    Anticipated Discharge  Information  Anticipated discharge disposition: Home  Discharge Address: [address]  Discharge Contact Phone Number:  (Poonam (mother) [phone])

## 2018-01-30 NOTE — ASSESSMENT & PLAN NOTE
Has urinalysis that was a dirty catch but did have packed white blood cells. Repeat urinalysis with large leuk esterase and WBC. Tachycardia but afebrile. Mild leukocytosis of 10.9 resolved.  - S/p 5 days of antibiotics

## 2018-01-30 NOTE — ED PROVIDER NOTES
ED PROVIDER NOTE    Scribed for Ricardo Warren M.D. by Keesha Wagner. 1/29/2018, 10:56 PM.    This is an addendum to the note on Nery Spicer. For further details, see the previously signed ED Provider Note written by Dr. Cruz (ERP).      9:20 PM - I discussed the patient's case with Dr. Cruz (ERP) who will transfer care of the patient to me at this time. 12 weeks pregnant, vomiting, worsening for days. Bedside US with normal IUP doubt molar pregnancy, ectopic. UA w/ strong e/o UTI. Plan recheck and PO challenge after IV fluid bolus.     10:57 PM Patient reevaluated at bedside. Vomited despite fluids and antiemetics. Given acidosis and low co2 plan d5 infusion and consider admit. Discussed plan for admission. She is agreeable. No cva tenderness doubt pyelonephritis.     11:22 PM Paged for hospitalist. Repeat BMP still with acidosis, pt still symptomatic and can't take PO so merits admit for fluids, abx for UTI, and observation for clinical decline.     11:29 PM Spoke to Dr. Ortega, hospitalist, about the patient's case. They will admit for further care and evaluation.    Results for orders placed or performed during the hospital encounter of 01/29/18   CBC WITH DIFFERENTIAL   Result Value Ref Range    WBC 10.9 (H) 4.8 - 10.8 K/uL    RBC 6.16 (H) 4.20 - 5.40 M/uL    Hemoglobin 16.1 (H) 12.0 - 16.0 g/dL    Hematocrit 49.3 (H) 37.0 - 47.0 %    MCV 80.0 (L) 81.4 - 97.8 fL    MCH 26.1 (L) 27.0 - 33.0 pg    MCHC 32.7 (L) 33.6 - 35.0 g/dL    RDW 50.1 (H) 35.9 - 50.0 fL    Platelet Count 310 164 - 446 K/uL    MPV 11.1 9.0 - 12.9 fL    Neutrophils-Polys 85.40 (H) 44.00 - 72.00 %    Lymphocytes 8.40 (L) 22.00 - 41.00 %    Monocytes 5.20 0.00 - 13.40 %    Eosinophils 0.10 0.00 - 6.90 %    Basophils 0.30 0.00 - 1.80 %    Immature Granulocytes 0.60 0.00 - 0.90 %    Nucleated RBC 0.00 /100 WBC    Neutrophils (Absolute) 9.29 (H) 2.00 - 7.15 K/uL    Lymphs (Absolute) 0.91 (L) 1.00 - 4.80 K/uL    Monos (Absolute)  0.57 0.00 - 0.85 K/uL    Eos (Absolute) 0.01 0.00 - 0.51 K/uL    Baso (Absolute) 0.03 0.00 - 0.12 K/uL    Immature Granulocytes (abs) 0.07 0.00 - 0.11 K/uL    NRBC (Absolute) 0.00 K/uL   COMP METABOLIC PANEL   Result Value Ref Range    Sodium 137 135 - 145 mmol/L    Potassium 4.1 3.6 - 5.5 mmol/L    Chloride 106 96 - 112 mmol/L    Co2 14 (L) 20 - 33 mmol/L    Anion Gap 17.0 (H) 0.0 - 11.9    Glucose 95 65 - 99 mg/dL    Bun 7 (L) 8 - 22 mg/dL    Creatinine 0.56 0.50 - 1.40 mg/dL    Calcium 10.4 8.5 - 10.5 mg/dL    AST(SGOT) 30 12 - 45 U/L    ALT(SGPT) 31 2 - 50 U/L    Alkaline Phosphatase 68 30 - 99 U/L    Total Bilirubin 0.9 0.1 - 1.5 mg/dL    Albumin 5.0 (H) 3.2 - 4.9 g/dL    Total Protein 8.1 6.0 - 8.2 g/dL    Globulin 3.1 1.9 - 3.5 g/dL    A-G Ratio 1.6 g/dL   LIPASE   Result Value Ref Range    Lipase 68 11 - 82 U/L   URINALYSIS CULTURE, IF INDICATED   Result Value Ref Range    Color DK Yellow     Character Turbid (A)     Specific Gravity 1.033 <1.035    Ph 5.5 5.0 - 8.0    Glucose Negative Negative mg/dL    Ketones >=160 Negative mg/dL    Protein 300 (A) Negative mg/dL    Bilirubin Moderate (A) Negative    Urobilinogen, Urine 1.0 Negative    Nitrite Negative Negative    Leukocyte Esterase Moderate (A) Negative    Occult Blood Trace (A) Negative    Micro Urine Req Microscopic     Culture Indicated Yes UA Culture   RH TYPE FOR RHOGAM FROM E.D.   Result Value Ref Range    Emergency Department Rh Typing POS     Number Of Rh Doses Indicated ZERO    URINE MICROSCOPIC (W/UA)   Result Value Ref Range    WBC Packed WBC /hpf    RBC 0-2 /hpf    Bacteria Many (A) None /hpf    Epithelial Cells Many (A) /hpf    Hyaline Cast 6-10 (A) /lpf   ESTIMATED GFR   Result Value Ref Range    GFR If African American >60 >60 mL/min/1.73 m 2    GFR If Non African American >60 >60 mL/min/1.73 m 2   BASIC METABOLIC PANEL   Result Value Ref Range    Sodium 142 135 - 145 mmol/L    Potassium 3.5 (L) 3.6 - 5.5 mmol/L    Chloride 113 (H) 96 - 112  mmol/L    Co2 15 (L) 20 - 33 mmol/L    Glucose 95 65 - 99 mg/dL    Bun 6 (L) 8 - 22 mg/dL    Creatinine 0.41 (L) 0.50 - 1.40 mg/dL    Calcium 8.6 8.5 - 10.5 mg/dL    Anion Gap 14.0 (H) 0.0 - 11.9   ESTIMATED GFR   Result Value Ref Range    GFR If African American >60 >60 mL/min/1.73 m 2    GFR If Non African American >60 >60 mL/min/1.73 m 2         DISPOSITION:  Patient will be admitted to [facility] in guarded condition.    FINAL IMPRESSION   1. Acute UTI    2. Hyperemesis gravidarum    3. Dehydration    4. High anion gap metabolic acidosis    5. First trimester pregnancy      Keesha BAER (Scriberick), am scribing for, and in the presence of, Ricardo Warren M.D.    Electronically signed by: Keesha Wagner (Ronald), 1/29/2018    Ricardo BAER M.D. personally performed the services described in this documentation, as scribed by Keesha Wagner in my presence, and it is both accurate and complete.    The note accurately reflects work and decisions made by me.  Ricardo Warren  1/30/2018  4:28 AM

## 2018-01-31 LAB
ALBUMIN SERPL BCP-MCNC: 3.4 G/DL (ref 3.2–4.9)
ANION GAP SERPL CALC-SCNC: 11 MMOL/L (ref 0–11.9)
BACTERIA UR CULT: ABNORMAL
BACTERIA UR CULT: ABNORMAL
BUN SERPL-MCNC: <3 MG/DL (ref 8–22)
CALCIUM SERPL-MCNC: 9.4 MG/DL (ref 8.5–10.5)
CHLORIDE SERPL-SCNC: 111 MMOL/L (ref 96–112)
CO2 SERPL-SCNC: 22 MMOL/L (ref 20–33)
CREAT SERPL-MCNC: 0.31 MG/DL (ref 0.5–1.4)
ERYTHROCYTE [DISTWIDTH] IN BLOOD BY AUTOMATED COUNT: 50.8 FL (ref 35.9–50)
GLUCOSE SERPL-MCNC: 80 MG/DL (ref 65–99)
HCT VFR BLD AUTO: 39 % (ref 37–47)
HGB BLD-MCNC: 13 G/DL (ref 12–16)
MAGNESIUM SERPL-MCNC: 1.6 MG/DL (ref 1.5–2.5)
MCH RBC QN AUTO: 26.5 PG (ref 27–33)
MCHC RBC AUTO-ENTMCNC: 33.3 G/DL (ref 33.6–35)
MCV RBC AUTO: 79.4 FL (ref 81.4–97.8)
PHOSPHATE SERPL-MCNC: 3.3 MG/DL (ref 2.5–4.5)
PLATELET # BLD AUTO: 235 K/UL (ref 164–446)
PMV BLD AUTO: 10.9 FL (ref 9–12.9)
POTASSIUM SERPL-SCNC: 3.2 MMOL/L (ref 3.6–5.5)
RBC # BLD AUTO: 4.91 M/UL (ref 4.2–5.4)
SIGNIFICANT IND 70042: ABNORMAL
SITE SITE: ABNORMAL
SODIUM SERPL-SCNC: 144 MMOL/L (ref 135–145)
SOURCE SOURCE: ABNORMAL
WBC # BLD AUTO: 7.2 K/UL (ref 4.8–10.8)

## 2018-01-31 PROCEDURE — 96376 TX/PRO/DX INJ SAME DRUG ADON: CPT

## 2018-01-31 PROCEDURE — 85027 COMPLETE CBC AUTOMATED: CPT

## 2018-01-31 PROCEDURE — 700105 HCHG RX REV CODE 258: Performed by: INTERNAL MEDICINE

## 2018-01-31 PROCEDURE — 96366 THER/PROPH/DIAG IV INF ADDON: CPT

## 2018-01-31 PROCEDURE — 80069 RENAL FUNCTION PANEL: CPT

## 2018-01-31 PROCEDURE — 83735 ASSAY OF MAGNESIUM: CPT

## 2018-01-31 PROCEDURE — 700102 HCHG RX REV CODE 250 W/ 637 OVERRIDE(OP): Performed by: INTERNAL MEDICINE

## 2018-01-31 PROCEDURE — 770020 HCHG ROOM/CARE - TELE (206)

## 2018-01-31 PROCEDURE — 700111 HCHG RX REV CODE 636 W/ 250 OVERRIDE (IP): Performed by: INTERNAL MEDICINE

## 2018-01-31 PROCEDURE — A9270 NON-COVERED ITEM OR SERVICE: HCPCS | Performed by: INTERNAL MEDICINE

## 2018-01-31 PROCEDURE — 700111 HCHG RX REV CODE 636 W/ 250 OVERRIDE (IP): Performed by: HOSPITALIST

## 2018-01-31 PROCEDURE — 700101 HCHG RX REV CODE 250: Performed by: INTERNAL MEDICINE

## 2018-01-31 PROCEDURE — 99233 SBSQ HOSP IP/OBS HIGH 50: CPT | Performed by: INTERNAL MEDICINE

## 2018-01-31 PROCEDURE — 36415 COLL VENOUS BLD VENIPUNCTURE: CPT

## 2018-01-31 RX ORDER — PROMETHAZINE HYDROCHLORIDE 25 MG/1
25 SUPPOSITORY RECTAL ONCE
Status: COMPLETED | OUTPATIENT
Start: 2018-01-31 | End: 2018-01-31

## 2018-01-31 RX ADMIN — PROMETHAZINE HYDROCHLORIDE 25 MG: 25 SUPPOSITORY RECTAL at 17:12

## 2018-01-31 RX ADMIN — PROMETHAZINE HYDROCHLORIDE 25 MG: 12.5 SUPPOSITORY RECTAL at 05:25

## 2018-01-31 RX ADMIN — CEPHALEXIN 500 MG: 500 CAPSULE ORAL at 14:46

## 2018-01-31 RX ADMIN — CEPHALEXIN 500 MG: 500 CAPSULE ORAL at 08:55

## 2018-01-31 RX ADMIN — PROMETHAZINE HYDROCHLORIDE 25 MG: 12.5 SUPPOSITORY RECTAL at 13:43

## 2018-01-31 RX ADMIN — CEFTRIAXONE 2 G: 2 INJECTION, POWDER, FOR SOLUTION INTRAMUSCULAR; INTRAVENOUS at 19:12

## 2018-01-31 RX ADMIN — ONDANSETRON 4 MG: 2 INJECTION INTRAMUSCULAR; INTRAVENOUS at 16:43

## 2018-01-31 RX ADMIN — ONDANSETRON 4 MG: 2 INJECTION INTRAMUSCULAR; INTRAVENOUS at 09:00

## 2018-01-31 RX ADMIN — POTASSIUM CHLORIDE: 2 INJECTION, SOLUTION, CONCENTRATE INTRAVENOUS at 08:54

## 2018-01-31 ASSESSMENT — ENCOUNTER SYMPTOMS
CHILLS: 0
EYE REDNESS: 0
EYE PAIN: 0
NAUSEA: 1
DIZZINESS: 0
HEMOPTYSIS: 0
MYALGIAS: 0
COUGH: 0
FOCAL WEAKNESS: 0
FALLS: 0
WEAKNESS: 0
BLOOD IN STOOL: 0
NERVOUS/ANXIOUS: 0
CONSTIPATION: 0
LOSS OF CONSCIOUSNESS: 0
INSOMNIA: 0
TREMORS: 0
SHORTNESS OF BREATH: 0
FEVER: 0
SEIZURES: 0
DIARRHEA: 0
WHEEZING: 0
ABDOMINAL PAIN: 1
PALPITATIONS: 0
HEADACHES: 0
VOMITING: 1

## 2018-01-31 ASSESSMENT — COGNITIVE AND FUNCTIONAL STATUS - GENERAL
SUGGESTED CMS G CODE MODIFIER DAILY ACTIVITY: CH
SUGGESTED CMS G CODE MODIFIER MOBILITY: CH
MOBILITY SCORE: 24
DAILY ACTIVITIY SCORE: 24

## 2018-01-31 ASSESSMENT — PAIN SCALES - GENERAL
PAINLEVEL_OUTOF10: 0

## 2018-01-31 ASSESSMENT — PATIENT HEALTH QUESTIONNAIRE - PHQ9
1. LITTLE INTEREST OR PLEASURE IN DOING THINGS: NOT AT ALL
SUM OF ALL RESPONSES TO PHQ QUESTIONS 1-9: 0
SUM OF ALL RESPONSES TO PHQ9 QUESTIONS 1 AND 2: 0
2. FEELING DOWN, DEPRESSED, IRRITABLE, OR HOPELESS: NOT AT ALL

## 2018-01-31 ASSESSMENT — LIFESTYLE VARIABLES
EVER_SMOKED: NEVER
ALCOHOL_USE: NO

## 2018-01-31 NOTE — PROGRESS NOTES
Assessment completed.  Pt A&Ox4.  Respirations even, unlabored on room air.  Pt denies any pain at this time.  Pt reports nausea, medicated per MAR.  IVF infusing per MAR.  Monitors applied, sinus rhythm noted.   Call light and belongings within reach.  Patient updated on POC, communications board updated.  Needs met, will continue to monitor

## 2018-01-31 NOTE — PROGRESS NOTES
Seen Pt, AOx 4, lying on bed. Plan of care discussed safety, fluids (rally bag q24 hours), control on N/V and pt understands.

## 2018-01-31 NOTE — CARE PLAN
Problem: Communication  Goal: The ability to communicate needs accurately and effectively will improve  Outcome: PROGRESSING AS EXPECTED  Patient able to communicate appropriately     Problem: Discharge Barriers/Planning  Goal: Patient's continuum of care needs will be met  Outcome: PROGRESSING AS EXPECTED  Electrolyte and IVF repletion. Cardiac monitoring, SR/ST. Symptom control.     Problem: Fluid Volume:  Goal: Will maintain balanced intake and output  Outcome: PROGRESSING AS EXPECTED  IV rally bag infusing, BMP and Mg to be rechecked

## 2018-01-31 NOTE — PROGRESS NOTES
Transporter at bedside.  Patient and mother states personal belongings are in possession.  Pt off unit via wheelchair with transporter to [id number], receiving RN informed.

## 2018-01-31 NOTE — PROGRESS NOTES
[facility] Hospitalist Progress Note    Date of Service: 2018    Chief Complaint  22 y.o. female admitted 2018 with N/V (pt has had n/v onset approx 5 days ago and worsening every day since.  Pt was seen at Urgent Care yesterday and given RX for zofran which is not helping at all.) and Pregnancy (LMP 17,  AMINA 19,  )        Interval Problem Update  Still has intractable nausea and vomiting.  Complains of occ abdominal cramps  Replacing potassium  Reassured her and explained about avoiding narcotics    Consultants/Specialty  Updating and doing recs from Dr. Tilley, ObGyn [id number]    Disposition  Home when better        Review of Systems   Constitutional: Negative for chills and fever.   HENT: Negative for congestion, hearing loss and nosebleeds.    Eyes: Negative for pain and redness.   Respiratory: Negative for cough, hemoptysis, shortness of breath and wheezing.    Cardiovascular: Negative for chest pain and palpitations.   Gastrointestinal: Positive for abdominal pain, nausea and vomiting. Negative for blood in stool, constipation and diarrhea.   Genitourinary: Negative for dysuria, frequency and hematuria.   Musculoskeletal: Negative for falls, joint pain and myalgias.   Skin: Negative for rash.   Neurological: Negative for dizziness, tremors, focal weakness, seizures, loss of consciousness, weakness and headaches.   Psychiatric/Behavioral: The patient is not nervous/anxious and does not have insomnia.    All other systems reviewed and are negative.     Physical Exam  Laboratory/Imaging   Hemodynamics  Temp (24hrs), Av.7 °C (98 °F), Min:36.5 °C (97.7 °F), Max:36.8 °C (98.2 °F)   Temperature: 36.5 °C (97.7 °F)  Pulse  Av.2  Min: 79  Max: 124    Blood Pressure: 116/75      Respiratory      Respiration: 16, Pulse Oximetry: 94 %             Fluids    Intake/Output Summary (Last 24 hours) at 18 1011  Last data filed at 18 0600   Gross per 24 hour   Intake               60  ml   Output              300 ml   Net             -240 ml       Nutrition  Orders Placed This Encounter   Procedures   • DIET NPO     Standing Status:   Standing     Number of Occurrences:   1     Order Specific Question:   Restrict to:     Answer:   Sips with Medications [3]     Physical Exam    Recent Labs      01/29/18 2007 01/31/18   0345   WBC  10.9*  7.2   RBC  6.16*  4.91   HEMOGLOBIN  16.1*  13.0   HEMATOCRIT  49.3*  39.0   MCV  80.0*  79.4*   MCH  26.1*  26.5*   MCHC  32.7*  33.3*   RDW  50.1*  50.8*   PLATELETCT  310  235   MPV  11.1  10.9     Recent Labs      01/29/18 2007 01/29/18   2251  01/30/18   0942  01/31/18   0345   SODIUM  137  142   --   144   POTASSIUM  4.1  3.5*  4.0  3.2*   CHLORIDE  106  113*   --   111   CO2  14*  15*   --   22   GLUCOSE  95  95   --   80   BUN  7*  6*   --   <3*   CREATININE  0.56  0.41*   --   0.31*   CALCIUM  10.4  8.6   --   9.4                      Assessment/Plan     * Hyperemesis gravidarum   Assessment & Plan    Continue antiemetics as needed.  Fluid resuscitation.  1/30. She continues to have nausea, vomiting and dehydration. Potassium dropping. Ordered magnesium level. Mainstay of treatment is gut rest, antiemetics (if more intractable consider steroids) and replacement of electrolytes, multivitamins, thiamine. Transfer to inpatient as this will take more than 2 midnights.  Called Amaya Hoffman for recommendations  Ordered phenergan suppositories.        Hypovolemia dehydration   Assessment & Plan    From  Vomiting  Will do rally bag as per Amaya Hoffman for recommendations        UTI (urinary tract infection) during pregnancy   Assessment & Plan    1/30. Has urinalysis that was a dirty catch but did have packed white blood cells. Repeat urinalysis and will get urine culture  If U/A positive for UTI then recommendation is to treat  Urinalysis turned out pyuria and leuk esterace  Spoke with Pharmacy. Started Keflex  Called her ObGyn   Treva and updated him          Metabolic acidosis   Assessment & Plan    Likely a combination of starvation and intractable vomiting. Monitor. Intravenous fluids for hydration, given concurrent hypokalemia, will administer lactated Ringer's supplemented with potassium  Transfer to inpatient as this will take more than 2 midnights and require monitoring        Pregnancy   Assessment & Plan    1st trimester of 1st pregnancy.  Update Dr. Tilley [phone]  Update Dr. Tilley [phone]    [phone]  [phone]    Called Amaya Presley [phone]          Hypokalemia   Assessment & Plan    Replace        Normocytic anemia   Assessment & Plan    Likely dilutional in the setting of pregnancy.  No active bleeding  Monitor        I spent 36 minutes, reviewing the chart, notes, vitals, labs, imaging, ordering labs, evaluating Nery Spicer for assessment, enacting the plan above. 50% of the time was spent in counseling Nery Spicer and answering questions. Time was devoted to counseling and coordinating care including review of records, pertinent lab data and studies, as well as discussing diagnostic evaluation and work up, planned therapeutic interventions and future disposition of care. Where indicated, the assessment and plan reflect discussion of patient with consultants, other healthcare providers, family members, and additional research needed to obtain further information in formulating the plan of care for Nery Spicer.     Quality-Core Measures   SCDs  I am encouraging her to walk more; avoiding anticoagulants as it crosses the placenta.

## 2018-02-01 LAB
ANION GAP SERPL CALC-SCNC: 14 MMOL/L (ref 0–11.9)
BUN SERPL-MCNC: 3 MG/DL (ref 8–22)
CALCIUM SERPL-MCNC: 9 MG/DL (ref 8.5–10.5)
CHLORIDE SERPL-SCNC: 109 MMOL/L (ref 96–112)
CO2 SERPL-SCNC: 20 MMOL/L (ref 20–33)
CREAT SERPL-MCNC: 0.29 MG/DL (ref 0.5–1.4)
ERYTHROCYTE [DISTWIDTH] IN BLOOD BY AUTOMATED COUNT: 50.9 FL (ref 35.9–50)
GLUCOSE SERPL-MCNC: 78 MG/DL (ref 65–99)
HCT VFR BLD AUTO: 40.4 % (ref 37–47)
HGB BLD-MCNC: 13.3 G/DL (ref 12–16)
MAGNESIUM SERPL-MCNC: 1.5 MG/DL (ref 1.5–2.5)
MCH RBC QN AUTO: 26.3 PG (ref 27–33)
MCHC RBC AUTO-ENTMCNC: 32.9 G/DL (ref 33.6–35)
MCV RBC AUTO: 79.8 FL (ref 81.4–97.8)
PLATELET # BLD AUTO: 228 K/UL (ref 164–446)
PMV BLD AUTO: 11.2 FL (ref 9–12.9)
POTASSIUM SERPL-SCNC: 3 MMOL/L (ref 3.6–5.5)
RBC # BLD AUTO: 5.06 M/UL (ref 4.2–5.4)
SODIUM SERPL-SCNC: 143 MMOL/L (ref 135–145)
WBC # BLD AUTO: 5.9 K/UL (ref 4.8–10.8)

## 2018-02-01 PROCEDURE — 770020 HCHG ROOM/CARE - TELE (206)

## 2018-02-01 PROCEDURE — 700111 HCHG RX REV CODE 636 W/ 250 OVERRIDE (IP): Performed by: HOSPITALIST

## 2018-02-01 PROCEDURE — 83735 ASSAY OF MAGNESIUM: CPT

## 2018-02-01 PROCEDURE — 700105 HCHG RX REV CODE 258: Performed by: HOSPITALIST

## 2018-02-01 PROCEDURE — 700102 HCHG RX REV CODE 250 W/ 637 OVERRIDE(OP): Performed by: INTERNAL MEDICINE

## 2018-02-01 PROCEDURE — 700101 HCHG RX REV CODE 250: Performed by: INTERNAL MEDICINE

## 2018-02-01 PROCEDURE — 85027 COMPLETE CBC AUTOMATED: CPT

## 2018-02-01 PROCEDURE — 80048 BASIC METABOLIC PNL TOTAL CA: CPT

## 2018-02-01 PROCEDURE — 700111 HCHG RX REV CODE 636 W/ 250 OVERRIDE (IP): Performed by: INTERNAL MEDICINE

## 2018-02-01 PROCEDURE — 700105 HCHG RX REV CODE 258: Performed by: INTERNAL MEDICINE

## 2018-02-01 PROCEDURE — 36415 COLL VENOUS BLD VENIPUNCTURE: CPT

## 2018-02-01 PROCEDURE — 99232 SBSQ HOSP IP/OBS MODERATE 35: CPT | Performed by: HOSPITALIST

## 2018-02-01 PROCEDURE — A9270 NON-COVERED ITEM OR SERVICE: HCPCS | Performed by: INTERNAL MEDICINE

## 2018-02-01 RX ORDER — MAGNESIUM SULFATE HEPTAHYDRATE 40 MG/ML
2 INJECTION, SOLUTION INTRAVENOUS ONCE
Status: COMPLETED | OUTPATIENT
Start: 2018-02-01 | End: 2018-02-01

## 2018-02-01 RX ORDER — DEXTROSE MONOHYDRATE, SODIUM CHLORIDE, AND POTASSIUM CHLORIDE 50; 1.49; 9 G/1000ML; G/1000ML; G/1000ML
INJECTION, SOLUTION INTRAVENOUS CONTINUOUS
Status: DISCONTINUED | OUTPATIENT
Start: 2018-02-01 | End: 2018-02-01

## 2018-02-01 RX ADMIN — POTASSIUM CHLORIDE: 2 INJECTION, SOLUTION, CONCENTRATE INTRAVENOUS at 19:55

## 2018-02-01 RX ADMIN — MAGNESIUM SULFATE IN WATER 2 G: 40 INJECTION, SOLUTION INTRAVENOUS at 18:56

## 2018-02-01 RX ADMIN — PROMETHAZINE HYDROCHLORIDE 25 MG: 12.5 SUPPOSITORY RECTAL at 02:28

## 2018-02-01 RX ADMIN — MAGNESIUM SULFATE IN WATER 2 G: 40 INJECTION, SOLUTION INTRAVENOUS at 15:39

## 2018-02-01 RX ADMIN — ONDANSETRON 4 MG: 2 INJECTION INTRAMUSCULAR; INTRAVENOUS at 19:52

## 2018-02-01 RX ADMIN — PROMETHAZINE HYDROCHLORIDE 25 MG: 12.5 SUPPOSITORY RECTAL at 20:55

## 2018-02-01 RX ADMIN — PROMETHAZINE HYDROCHLORIDE 25 MG: 12.5 SUPPOSITORY RECTAL at 15:38

## 2018-02-01 RX ADMIN — POTASSIUM CHLORIDE: 2 INJECTION, SOLUTION, CONCENTRATE INTRAVENOUS at 10:58

## 2018-02-01 RX ADMIN — CEFTRIAXONE 2 G: 2 INJECTION, POWDER, FOR SOLUTION INTRAMUSCULAR; INTRAVENOUS at 09:23

## 2018-02-01 RX ADMIN — PROMETHAZINE HYDROCHLORIDE 25 MG: 12.5 SUPPOSITORY RECTAL at 09:23

## 2018-02-01 RX ADMIN — ONDANSETRON 4 MG: 2 INJECTION INTRAMUSCULAR; INTRAVENOUS at 07:31

## 2018-02-01 ASSESSMENT — ENCOUNTER SYMPTOMS
BLOOD IN STOOL: 0
FEVER: 0
FALLS: 0
CHILLS: 0
DIARRHEA: 0
PSYCHIATRIC NEGATIVE: 1
PALPITATIONS: 0
EYE REDNESS: 0
VOMITING: 1
LOSS OF CONSCIOUSNESS: 0
HEADACHES: 0
FOCAL WEAKNESS: 0
SHORTNESS OF BREATH: 0
WEAKNESS: 0
DIZZINESS: 0
ABDOMINAL PAIN: 1
COUGH: 0
WHEEZING: 0
SEIZURES: 0
EYE PAIN: 0
NAUSEA: 1
CONSTIPATION: 0
MYALGIAS: 0

## 2018-02-01 ASSESSMENT — PAIN SCALES - GENERAL
PAINLEVEL_OUTOF10: 0

## 2018-02-01 NOTE — PROGRESS NOTES
2 RN skin check completed with Vianca BURGOS . Patient's skin is intact. Small amount of blanchable redness behind bilateral ears from eye glasses.

## 2018-02-01 NOTE — PROGRESS NOTES
Monitor Summary:  SR  .14/.08/.36  67-95  Isolated increase to 160 when up to restroom, non sustained and asymptomatic

## 2018-02-01 NOTE — CARE PLAN
Problem: Communication  Goal: The ability to communicate needs accurately and effectively will improve  Outcome: PROGRESSING AS EXPECTED  Ongoing communication concerning tests, labs, procedures and disease process.       Problem: Safety  Goal: Will remain free from injury  Hourly rounding, bed low and locked. Call light within reach. Alarms on for safety.

## 2018-02-01 NOTE — PROGRESS NOTES
Assumed care at 0700. Bedside report received from Latasha. Patient's chart and MAR reviewed. Pt denies pain at this time. Pt is A & O 4. Patient was updated on plan of care for the day. Questions answered and concerns addressed.  Pt denies any additional needs at this time. White board updated. Call light, phone and personal belongings within reach.

## 2018-02-02 ENCOUNTER — PATIENT OUTREACH (OUTPATIENT)
Dept: HEALTH INFORMATION MANAGEMENT | Facility: OTHER | Age: 23
End: 2018-02-02

## 2018-02-02 ENCOUNTER — APPOINTMENT (OUTPATIENT)
Dept: RADIOLOGY | Facility: MEDICAL CENTER | Age: 23
DRG: 781 | End: 2018-02-02
Attending: HOSPITALIST
Payer: COMMERCIAL

## 2018-02-02 LAB
ALBUMIN SERPL BCP-MCNC: 3.1 G/DL (ref 3.2–4.9)
ALBUMIN/GLOB SERPL: 1.1 G/DL
ALP SERPL-CCNC: 56 U/L (ref 30–99)
ALT SERPL-CCNC: 27 U/L (ref 2–50)
ANION GAP SERPL CALC-SCNC: 9 MMOL/L (ref 0–11.9)
AST SERPL-CCNC: 25 U/L (ref 12–45)
BILIRUB SERPL-MCNC: 0.6 MG/DL (ref 0.1–1.5)
BUN SERPL-MCNC: <3 MG/DL (ref 8–22)
CALCIUM SERPL-MCNC: 8.3 MG/DL (ref 8.5–10.5)
CHLORIDE SERPL-SCNC: 111 MMOL/L (ref 96–112)
CO2 SERPL-SCNC: 22 MMOL/L (ref 20–33)
CREAT SERPL-MCNC: 0.32 MG/DL (ref 0.5–1.4)
ERYTHROCYTE [DISTWIDTH] IN BLOOD BY AUTOMATED COUNT: 51.5 FL (ref 35.9–50)
GLOBULIN SER CALC-MCNC: 2.7 G/DL (ref 1.9–3.5)
GLUCOSE SERPL-MCNC: 102 MG/DL (ref 65–99)
HCT VFR BLD AUTO: 42.6 % (ref 37–47)
HGB BLD-MCNC: 13.3 G/DL (ref 12–16)
MAGNESIUM SERPL-MCNC: 2.1 MG/DL (ref 1.5–2.5)
MCH RBC QN AUTO: 25.1 PG (ref 27–33)
MCHC RBC AUTO-ENTMCNC: 31.2 G/DL (ref 33.6–35)
MCV RBC AUTO: 80.5 FL (ref 81.4–97.8)
PLATELET # BLD AUTO: 231 K/UL (ref 164–446)
PMV BLD AUTO: 10.9 FL (ref 9–12.9)
POTASSIUM SERPL-SCNC: 3.1 MMOL/L (ref 3.6–5.5)
PROT SERPL-MCNC: 5.8 G/DL (ref 6–8.2)
RBC # BLD AUTO: 5.29 M/UL (ref 4.2–5.4)
SODIUM SERPL-SCNC: 142 MMOL/L (ref 135–145)
WBC # BLD AUTO: 6.3 K/UL (ref 4.8–10.8)

## 2018-02-02 PROCEDURE — 700102 HCHG RX REV CODE 250 W/ 637 OVERRIDE(OP): Performed by: HOSPITALIST

## 2018-02-02 PROCEDURE — 83735 ASSAY OF MAGNESIUM: CPT

## 2018-02-02 PROCEDURE — A9270 NON-COVERED ITEM OR SERVICE: HCPCS | Performed by: INTERNAL MEDICINE

## 2018-02-02 PROCEDURE — 700111 HCHG RX REV CODE 636 W/ 250 OVERRIDE (IP): Performed by: HOSPITALIST

## 2018-02-02 PROCEDURE — 85027 COMPLETE CBC AUTOMATED: CPT

## 2018-02-02 PROCEDURE — 76815 OB US LIMITED FETUS(S): CPT

## 2018-02-02 PROCEDURE — 700101 HCHG RX REV CODE 250: Performed by: INTERNAL MEDICINE

## 2018-02-02 PROCEDURE — 99232 SBSQ HOSP IP/OBS MODERATE 35: CPT | Performed by: HOSPITALIST

## 2018-02-02 PROCEDURE — 770020 HCHG ROOM/CARE - TELE (206)

## 2018-02-02 PROCEDURE — 80053 COMPREHEN METABOLIC PANEL: CPT

## 2018-02-02 PROCEDURE — 700105 HCHG RX REV CODE 258: Performed by: HOSPITALIST

## 2018-02-02 PROCEDURE — 700111 HCHG RX REV CODE 636 W/ 250 OVERRIDE (IP): Performed by: INTERNAL MEDICINE

## 2018-02-02 PROCEDURE — 700102 HCHG RX REV CODE 250 W/ 637 OVERRIDE(OP): Performed by: INTERNAL MEDICINE

## 2018-02-02 PROCEDURE — 700105 HCHG RX REV CODE 258: Performed by: INTERNAL MEDICINE

## 2018-02-02 PROCEDURE — 36415 COLL VENOUS BLD VENIPUNCTURE: CPT

## 2018-02-02 PROCEDURE — A9270 NON-COVERED ITEM OR SERVICE: HCPCS | Performed by: HOSPITALIST

## 2018-02-02 RX ORDER — POTASSIUM CHLORIDE 20 MEQ/1
40 TABLET, EXTENDED RELEASE ORAL ONCE
Status: COMPLETED | OUTPATIENT
Start: 2018-02-02 | End: 2018-02-02

## 2018-02-02 RX ORDER — METOCLOPRAMIDE HYDROCHLORIDE 5 MG/ML
10 INJECTION INTRAMUSCULAR; INTRAVENOUS EVERY 6 HOURS PRN
Status: DISCONTINUED | OUTPATIENT
Start: 2018-02-02 | End: 2018-02-07 | Stop reason: HOSPADM

## 2018-02-02 RX ADMIN — POTASSIUM CHLORIDE: 2 INJECTION, SOLUTION, CONCENTRATE INTRAVENOUS at 20:28

## 2018-02-02 RX ADMIN — PROMETHAZINE HYDROCHLORIDE 25 MG: 12.5 SUPPOSITORY RECTAL at 08:52

## 2018-02-02 RX ADMIN — PROMETHAZINE HYDROCHLORIDE 25 MG: 12.5 SUPPOSITORY RECTAL at 16:46

## 2018-02-02 RX ADMIN — POTASSIUM CHLORIDE 40 MEQ: 1500 TABLET, EXTENDED RELEASE ORAL at 10:16

## 2018-02-02 RX ADMIN — ONDANSETRON 4 MG: 2 INJECTION INTRAMUSCULAR; INTRAVENOUS at 03:47

## 2018-02-02 RX ADMIN — CEFTRIAXONE 2 G: 2 INJECTION, POWDER, FOR SOLUTION INTRAMUSCULAR; INTRAVENOUS at 08:53

## 2018-02-02 RX ADMIN — ONDANSETRON 4 MG: 2 INJECTION INTRAMUSCULAR; INTRAVENOUS at 20:33

## 2018-02-02 RX ADMIN — POTASSIUM CHLORIDE: 2 INJECTION, SOLUTION, CONCENTRATE INTRAVENOUS at 08:53

## 2018-02-02 RX ADMIN — ONDANSETRON 4 MG: 2 INJECTION INTRAMUSCULAR; INTRAVENOUS at 12:41

## 2018-02-02 RX ADMIN — PROMETHAZINE HYDROCHLORIDE 25 MG: 12.5 SUPPOSITORY RECTAL at 02:37

## 2018-02-02 RX ADMIN — METOCLOPRAMIDE 10 MG: 5 INJECTION, SOLUTION INTRAMUSCULAR; INTRAVENOUS at 15:35

## 2018-02-02 ASSESSMENT — ENCOUNTER SYMPTOMS
LOSS OF CONSCIOUSNESS: 0
DIZZINESS: 0
BLOOD IN STOOL: 0
NAUSEA: 1
MYALGIAS: 0
COUGH: 0
ABDOMINAL PAIN: 1
VOMITING: 1
DIARRHEA: 0
HEADACHES: 0
FEVER: 0
CONSTIPATION: 0
CHILLS: 0
SEIZURES: 0
PSYCHIATRIC NEGATIVE: 1
EYE REDNESS: 0
FOCAL WEAKNESS: 0
FALLS: 0
EYE PAIN: 0
WEAKNESS: 0
PALPITATIONS: 0
SHORTNESS OF BREATH: 0

## 2018-02-02 ASSESSMENT — LIFESTYLE VARIABLES: DO YOU DRINK ALCOHOL: NO

## 2018-02-02 ASSESSMENT — PAIN SCALES - GENERAL
PAINLEVEL_OUTOF10: 0

## 2018-02-02 NOTE — CARE PLAN
Problem: Communication  Goal: The ability to communicate needs accurately and effectively will improve  Outcome: PROGRESSING AS EXPECTED  Patient communicates appropriately     Problem: Bowel/Gastric:  Goal: Normal bowel function is maintained or improved  Outcome: PROGRESSING SLOWER THAN EXPECTED  Patient still presenting with intractable nausea and vomiting, only temporarily relieved by antiemetics.     Problem: Fluid Volume:  Goal: Will maintain balanced intake and output  Outcome: PROGRESSING AS EXPECTED  IVF Rally bags/maintainence fluid. Electrolyte repletion.

## 2018-02-02 NOTE — PROGRESS NOTES
[facility] Hospitalist Progress Note    Date of Service: 2018    Chief Complaint  22 y.o. female admitted 2018 with N/V x5 days with progressive worsening. She had been evaluated and urgent care but zofran prescribed didn't give her any relief. LMP 17 with AMINA 18.    Interval Problem Update  Still with significant nausea but vomiting has slightly improved.    Consultants/Specialty  None  Recommendations from outpatient obgyn, Dr. Tilley    Disposition  When able to tolerate diet.        Review of Systems   Constitutional: Positive for malaise/fatigue. Negative for chills and fever.   HENT: Negative for congestion, hearing loss and nosebleeds.    Eyes: Negative for pain and redness.   Respiratory: Negative for cough, shortness of breath and wheezing.    Cardiovascular: Negative for chest pain and palpitations.   Gastrointestinal: Positive for abdominal pain (cramping), nausea and vomiting. Negative for blood in stool, constipation and diarrhea.   Genitourinary: Negative for dysuria, frequency and hematuria.   Musculoskeletal: Negative for falls, joint pain and myalgias.   Skin: Negative for rash.   Neurological: Negative for dizziness, focal weakness, seizures, loss of consciousness, weakness and headaches.   Psychiatric/Behavioral: Negative.    All other systems reviewed and are negative.     Physical Exam  Laboratory/Imaging   Hemodynamics  Temp (24hrs), Av.7 °C (98.1 °F), Min:36 °C (96.8 °F), Max:37.7 °C (99.8 °F)   Temperature: 37.7 °C (99.8 °F)  Pulse  Av.7  Min: 79  Max: 124    Blood Pressure: 116/67      Respiratory      Respiration: 14, Pulse Oximetry: 98 %             Fluids    Intake/Output Summary (Last 24 hours) at 18 1927  Last data filed at 18 0500   Gross per 24 hour   Intake              950 ml   Output                0 ml   Net              950 ml       Nutrition  Orders Placed This Encounter   Procedures   • DIET ORDER     Standing Status:   Standing      Number of Occurrences:   1     Order Specific Question:   Diet:     Answer:   Clear Liquids - No Red Foods [12]     Physical Exam   Constitutional: She is oriented to person, place, and time. She appears well-developed. No distress.   HENT:   Mouth/Throat: Oropharynx is clear and moist.   Eyes: Pupils are equal, round, and reactive to light. No scleral icterus.   Neck: Normal range of motion. Neck supple.   Cardiovascular: Regular rhythm.  Tachycardia present.    Pulmonary/Chest: Breath sounds normal. No respiratory distress. She has no rales.   Abdominal: Soft. Bowel sounds are normal. She exhibits no distension. There is no tenderness.   Musculoskeletal: She exhibits no edema.   Lymphadenopathy:     She has no cervical adenopathy.   Neurological: She is alert and oriented to person, place, and time.   Skin: Skin is warm and dry.   Psychiatric: She has a normal mood and affect. Her behavior is normal.   Vitals reviewed.      Recent Labs      01/29/18 2007 01/31/18 0345 02/01/18   0201   WBC  10.9*  7.2  5.9   RBC  6.16*  4.91  5.06   HEMOGLOBIN  16.1*  13.0  13.3   HEMATOCRIT  49.3*  39.0  40.4   MCV  80.0*  79.4*  79.8*   MCH  26.1*  26.5*  26.3*   MCHC  32.7*  33.3*  32.9*   RDW  50.1*  50.8*  50.9*   PLATELETCT  310  235  228   MPV  11.1  10.9  11.2     Recent Labs      01/29/18   2251  01/30/18   0942  01/31/18 0345 02/01/18   0201   SODIUM  142   --   144  143   POTASSIUM  3.5*  4.0  3.2*  3.0*   CHLORIDE  113*   --   111  109   CO2  15*   --   22  20   GLUCOSE  95   --   80  78   BUN  6*   --   <3*  3*   CREATININE  0.41*   --   0.31*  0.29*   CALCIUM  8.6   --   9.4  9.0                      Assessment/Plan     * Hyperemesis gravidarum- (present on admission)   Assessment & Plan    Conservative management. Slightly improved today.  - continue zofran and phenergan suppositories  - start D5NS with KCl  - s/p bowel rest and will start clears, as tolerated        UTI (urinary tract infection) during  pregnancy- (present on admission)   Assessment & Plan    Has urinalysis that was a dirty catch but did have packed white blood cells. Repeat urinalysis with large leuk esterase and WBC. Tachycardia but afebrile and leukocytosis resolved. WBC 10.9 --> 5.9  - continue ceftriaxone        Metabolic acidosis- (present on admission)   Assessment & Plan    With dehydration from poor PO intake and intractable vomiting.  - IVF  - correct electrolytes        Hypokalemia- (present on admission)   Assessment & Plan    From GI losses.  - monitor and replete  - will do orally when able to tolerate        Pregnancy- (present on admission)   Assessment & Plan    1st trimester of 1st pregnancy. Update outpatient Dr. Treva turner [phone]  [place]              Reviewed items::  Labs reviewed, Medications reviewed and EKG reviewed  Tafoya catheter::  No Tafoya  DVT prophylaxis - mechanical:  SCDs  Ulcer Prophylaxis::  Not indicated     SCDs  I am encouraging her to walk more; avoiding anticoagulants as it crosses the placenta.

## 2018-02-02 NOTE — PROGRESS NOTES
[facility] Hospitalist Progress Note    Date of Service: 2018    Chief Complaint  22 y.o. female admitted 2018 with N/V x5 days with progressive worsening. She had been evaluated and urgent care but zofran prescribed didn't give her any relief. LMP 17 with AMINA 18.    Interval Problem Update  Has not been able to tolerate clears. Continues to have recurrent nausea and vomiting. Regularly getting Phenergan suppositories and Zofran IV. No acute overnight events.    Consultants/Specialty  None  Recommendations from outpatient obgyn, Dr. Tilley    Disposition  When able to tolerate diet.        Review of Systems   Constitutional: Positive for malaise/fatigue. Negative for chills and fever.   HENT: Negative for congestion and hearing loss.    Eyes: Negative for pain and redness.   Respiratory: Negative for cough and shortness of breath.    Cardiovascular: Negative for chest pain and palpitations.   Gastrointestinal: Positive for abdominal pain (cramping), nausea and vomiting. Negative for blood in stool, constipation and diarrhea.   Genitourinary: Negative for dysuria, frequency and hematuria.   Musculoskeletal: Negative for falls, joint pain and myalgias.   Skin: Negative for rash.   Neurological: Negative for dizziness, focal weakness, seizures, loss of consciousness, weakness and headaches.   Psychiatric/Behavioral: Negative.    All other systems reviewed and are negative.     Physical Exam  Laboratory/Imaging   Hemodynamics  Temp (24hrs), Av.7 °C (98 °F), Min:36 °C (96.8 °F), Max:37.7 °C (99.8 °F)   Temperature: 36.6 °C (97.8 °F)  Pulse  Av.8  Min: 79  Max: 124    Blood Pressure: 111/74      Respiratory      Respiration: 16, Pulse Oximetry: 95 %             Fluids    Intake/Output Summary (Last 24 hours) at 18 1430  Last data filed at 18   Gross per 24 hour   Intake              445 ml   Output                0 ml   Net              445 ml       Nutrition  Orders Placed  This Encounter   Procedures   • DIET NPO     Standing Status:   Standing     Number of Occurrences:   1     Order Specific Question:   Restrict to:     Answer:   Sips with Medications [3]     Physical Exam   Constitutional: She is oriented to person, place, and time. She appears well-developed. No distress.   HENT:   Mouth/Throat: Oropharynx is clear and moist.   Eyes: Pupils are equal, round, and reactive to light. No scleral icterus.   Neck: Normal range of motion. Neck supple.   Cardiovascular: Regular rhythm.  Tachycardia present.    Pulmonary/Chest: Breath sounds normal. No respiratory distress. She has no rales.   Abdominal: Soft. Bowel sounds are normal. She exhibits no distension. There is no tenderness. There is no rebound.   Musculoskeletal: She exhibits no edema.   Neurological: She is alert and oriented to person, place, and time.   Skin: Skin is warm.   Psychiatric: She has a normal mood and affect. Her behavior is normal.   Vitals reviewed.      Recent Labs      01/31/18 0345 02/01/18   0201 02/02/18 0224   WBC  7.2  5.9  6.3   RBC  4.91  5.06  5.29   HEMOGLOBIN  13.0  13.3  13.3   HEMATOCRIT  39.0  40.4  42.6   MCV  79.4*  79.8*  80.5*   MCH  26.5*  26.3*  25.1*   MCHC  33.3*  32.9*  31.2*   RDW  50.8*  50.9*  51.5*   PLATELETCT  235  228  231   MPV  10.9  11.2  10.9     Recent Labs      01/31/18   0345  02/01/18   0201 02/02/18 0224   SODIUM  144  143  142   POTASSIUM  3.2*  3.0*  3.1*   CHLORIDE  111  109  111   CO2  22  20  22   GLUCOSE  80  78  102*   BUN  <3*  3*  <3*   CREATININE  0.31*  0.29*  0.32*   CALCIUM  9.4  9.0  8.3*                      Assessment/Plan     * Hyperemesis gravidarum- (present on admission)   Assessment & Plan    Discussed with her outpatient ObGyn who explained that it typically resolves after the 16th week and if continues patient may require TPN. Conservative management. Slightly improved today.  - continue phenergan suppositories, Zofran IV  - Adding Reglan  IV when necessary  - start D5NS with KCl to run after daily multivitamin bag  - Resume complete bowel rest        Hypokalemia- (present on admission)   Assessment & Plan    From GI losses.  - monitor and replete  - Was not able to tolerate oral replacement today   - Continue with potassium in the multivitamin bag and continuous fluids        Pregnancy- (present on admission)   Assessment & Plan    1st trimester of 1st pregnancy. Update outpatient Dr. Treva turner [phone]  - Transvaginal ultrasound pending        UTI (urinary tract infection) during pregnancy- (present on admission)   Assessment & Plan    Has urinalysis that was a dirty catch but did have packed white blood cells. Repeat urinalysis with large leuk esterase and WBC. Tachycardia but afebrile and leukocytosis resolved. WBC 10.9 --> 5.9  - Status post 5 days of antibiotics               Reviewed items::  Labs reviewed, Medications reviewed and EKG reviewed  Tafoya catheter::  No Tafoya  DVT prophylaxis - mechanical:  SCDs  Ulcer Prophylaxis::  Not indicated

## 2018-02-02 NOTE — CARE PLAN
Problem: Safety  Goal: Will remain free from falls  Outcome: PROGRESSING AS EXPECTED  Pt educated on the importance fall prevention methods, such as treaded sock and the bed alarm. Pt stated they will use the call light prior to any attempts of ambulation. Ambulatory ability assessed, treaded socks in place, bed locked and in low position, frequent trips to bathroom offered, and call light and phone within reach.    Problem: Bowel/Gastric:  Goal: Will not experience complications related to bowel motility  Outcome: PROGRESSING AS EXPECTED  Pt medicated around the clock for hyperemesis gravidarum.

## 2018-02-03 LAB
ALBUMIN SERPL BCP-MCNC: 3.6 G/DL (ref 3.2–4.9)
ALBUMIN/GLOB SERPL: 1.6 G/DL
ALP SERPL-CCNC: 52 U/L (ref 30–99)
ALT SERPL-CCNC: 33 U/L (ref 2–50)
ANION GAP SERPL CALC-SCNC: 8 MMOL/L (ref 0–11.9)
AST SERPL-CCNC: 30 U/L (ref 12–45)
BILIRUB SERPL-MCNC: 0.5 MG/DL (ref 0.1–1.5)
BUN SERPL-MCNC: <3 MG/DL (ref 8–22)
CALCIUM SERPL-MCNC: 8.7 MG/DL (ref 8.5–10.5)
CHLORIDE SERPL-SCNC: 108 MMOL/L (ref 96–112)
CO2 SERPL-SCNC: 22 MMOL/L (ref 20–33)
CREAT SERPL-MCNC: 0.35 MG/DL (ref 0.5–1.4)
ERYTHROCYTE [DISTWIDTH] IN BLOOD BY AUTOMATED COUNT: 50.9 FL (ref 35.9–50)
GLOBULIN SER CALC-MCNC: 2.3 G/DL (ref 1.9–3.5)
GLUCOSE SERPL-MCNC: 92 MG/DL (ref 65–99)
HCT VFR BLD AUTO: 41.1 % (ref 37–47)
HGB BLD-MCNC: 13.5 G/DL (ref 12–16)
MCH RBC QN AUTO: 26.3 PG (ref 27–33)
MCHC RBC AUTO-ENTMCNC: 32.8 G/DL (ref 33.6–35)
MCV RBC AUTO: 80 FL (ref 81.4–97.8)
PLATELET # BLD AUTO: 224 K/UL (ref 164–446)
PMV BLD AUTO: 11.1 FL (ref 9–12.9)
POTASSIUM SERPL-SCNC: 3.1 MMOL/L (ref 3.6–5.5)
PROT SERPL-MCNC: 5.9 G/DL (ref 6–8.2)
RBC # BLD AUTO: 5.14 M/UL (ref 4.2–5.4)
SODIUM SERPL-SCNC: 138 MMOL/L (ref 135–145)
WBC # BLD AUTO: 5.9 K/UL (ref 4.8–10.8)

## 2018-02-03 PROCEDURE — A9270 NON-COVERED ITEM OR SERVICE: HCPCS | Performed by: HOSPITALIST

## 2018-02-03 PROCEDURE — 80053 COMPREHEN METABOLIC PANEL: CPT

## 2018-02-03 PROCEDURE — 700105 HCHG RX REV CODE 258: Performed by: INTERNAL MEDICINE

## 2018-02-03 PROCEDURE — 770020 HCHG ROOM/CARE - TELE (206)

## 2018-02-03 PROCEDURE — 700102 HCHG RX REV CODE 250 W/ 637 OVERRIDE(OP): Performed by: INTERNAL MEDICINE

## 2018-02-03 PROCEDURE — A9270 NON-COVERED ITEM OR SERVICE: HCPCS | Performed by: INTERNAL MEDICINE

## 2018-02-03 PROCEDURE — 36415 COLL VENOUS BLD VENIPUNCTURE: CPT

## 2018-02-03 PROCEDURE — 700105 HCHG RX REV CODE 258: Performed by: HOSPITALIST

## 2018-02-03 PROCEDURE — 85027 COMPLETE CBC AUTOMATED: CPT

## 2018-02-03 PROCEDURE — 700102 HCHG RX REV CODE 250 W/ 637 OVERRIDE(OP): Performed by: HOSPITALIST

## 2018-02-03 PROCEDURE — 99232 SBSQ HOSP IP/OBS MODERATE 35: CPT | Performed by: HOSPITALIST

## 2018-02-03 PROCEDURE — 700111 HCHG RX REV CODE 636 W/ 250 OVERRIDE (IP): Performed by: HOSPITALIST

## 2018-02-03 PROCEDURE — 700101 HCHG RX REV CODE 250: Performed by: INTERNAL MEDICINE

## 2018-02-03 PROCEDURE — 700111 HCHG RX REV CODE 636 W/ 250 OVERRIDE (IP): Performed by: INTERNAL MEDICINE

## 2018-02-03 RX ADMIN — PROMETHAZINE HYDROCHLORIDE 25 MG: 12.5 SUPPOSITORY RECTAL at 14:15

## 2018-02-03 RX ADMIN — POTASSIUM CHLORIDE: 2 INJECTION, SOLUTION, CONCENTRATE INTRAVENOUS at 09:35

## 2018-02-03 RX ADMIN — PROMETHAZINE HYDROCHLORIDE 25 MG: 12.5 SUPPOSITORY RECTAL at 03:46

## 2018-02-03 RX ADMIN — METOCLOPRAMIDE 10 MG: 5 INJECTION, SOLUTION INTRAMUSCULAR; INTRAVENOUS at 09:35

## 2018-02-03 RX ADMIN — STANDARDIZED SENNA CONCENTRATE AND DOCUSATE SODIUM 2 TABLET: 8.6; 5 TABLET, FILM COATED ORAL at 09:35

## 2018-02-03 RX ADMIN — POTASSIUM CHLORIDE: 2 INJECTION, SOLUTION, CONCENTRATE INTRAVENOUS at 19:46

## 2018-02-03 ASSESSMENT — ENCOUNTER SYMPTOMS
LOSS OF CONSCIOUSNESS: 0
DIARRHEA: 0
NAUSEA: 1
WEAKNESS: 0
CHILLS: 0
HEADACHES: 0
CONSTIPATION: 0
VOMITING: 0
ABDOMINAL PAIN: 1
DIZZINESS: 0
COUGH: 0
EYE PAIN: 0
FEVER: 0
FALLS: 0
EYE REDNESS: 0
BLOOD IN STOOL: 0
PSYCHIATRIC NEGATIVE: 1
SHORTNESS OF BREATH: 0
PALPITATIONS: 0

## 2018-02-03 ASSESSMENT — LIFESTYLE VARIABLES: DO YOU DRINK ALCOHOL: NO

## 2018-02-03 ASSESSMENT — PAIN SCALES - GENERAL
PAINLEVEL_OUTOF10: 0

## 2018-02-03 NOTE — PROGRESS NOTES
"[facility] Hospitalist Progress Note    Date of Service: 2/3/2018    Chief Complaint  22 y.o. female admitted 2018 with N/V x5 days with progressive worsening. She had been evaluated and urgent care but zofran prescribed didn't give her any relief. LMP 17 with AMINA 18.    Interval Problem Update  Reports feeling \"blah\" but no vomiting overnight. No questions or concerns at this time. Reviewed abdominal US with her. Discussed with bedside RN.    Consultants/Specialty  None  Recommendations from outpatient obgyn, Dr. Tilley    Disposition  When able to tolerate diet.        Review of Systems   Constitutional: Positive for malaise/fatigue. Negative for chills and fever.   HENT: Negative for congestion and hearing loss.    Eyes: Negative for pain and redness.   Respiratory: Negative for cough and shortness of breath.    Cardiovascular: Negative for chest pain and palpitations.   Gastrointestinal: Positive for abdominal pain (cramping) and nausea. Negative for blood in stool, constipation, diarrhea and vomiting.   Genitourinary: Negative for dysuria.   Musculoskeletal: Negative for falls.   Skin: Negative for rash.   Neurological: Negative for dizziness, loss of consciousness, weakness and headaches.   Psychiatric/Behavioral: Negative.    All other systems reviewed and are negative.     Physical Exam  Laboratory/Imaging   Hemodynamics  Temp (24hrs), Av.7 °C (98 °F), Min:36.1 °C (97 °F), Max:36.9 °C (98.5 °F)   Temperature: 36.9 °C (98.4 °F)  Pulse  Av.3  Min: 79  Max: 124    Blood Pressure: 112/79      Respiratory      Respiration: 16, Pulse Oximetry: 94 %             Fluids    Intake/Output Summary (Last 24 hours) at 18 1017  Last data filed at 18 0800   Gross per 24 hour   Intake              925 ml   Output                0 ml   Net              925 ml       Nutrition  Orders Placed This Encounter   Procedures   • DIET NPO     Standing Status:   Standing     Number of Occurrences:   " 1     Order Specific Question:   Restrict to:     Answer:   Sips with Medications [3]     Physical Exam   Constitutional: She is oriented to person, place, and time. She appears well-developed. No distress.   HENT:   Mouth/Throat: Oropharynx is clear and moist.   Eyes: EOM are normal. No scleral icterus.   Neck: Normal range of motion. Neck supple.   Cardiovascular: Regular rhythm.  Tachycardia present.    Pulmonary/Chest: Breath sounds normal. No respiratory distress. She has no rales.   Abdominal: Soft. Bowel sounds are normal. She exhibits no distension.   Musculoskeletal: She exhibits no edema.   Neurological: She is alert and oriented to person, place, and time.   Skin: Skin is warm.   Psychiatric: She has a normal mood and affect. Her behavior is normal.   Vitals reviewed.      Recent Labs      02/01/18   0201 02/02/18 0224 02/03/18   0409   WBC  5.9  6.3  5.9   RBC  5.06  5.29  5.14   HEMOGLOBIN  13.3  13.3  13.5   HEMATOCRIT  40.4  42.6  41.1   MCV  79.8*  80.5*  80.0*   MCH  26.3*  25.1*  26.3*   MCHC  32.9*  31.2*  32.8*   RDW  50.9*  51.5*  50.9*   PLATELETCT  228  231  224   MPV  11.2  10.9  11.1     Recent Labs      02/01/18 0201 02/02/18 0224 02/03/18   0409   SODIUM  143  142  138   POTASSIUM  3.0*  3.1*  3.1*   CHLORIDE  109  111  108   CO2  20  22  22   GLUCOSE  78  102*  92   BUN  3*  <3*  <3*   CREATININE  0.29*  0.32*  0.35*   CALCIUM  9.0  8.3*  8.7                      Assessment/Plan     * Hyperemesis gravidarum- (present on admission)   Assessment & Plan    Discussed with her outpatient ObGyn who explained that it typically resolves after the 16th week and if continues patient may require TPN. Conservative management. Slightly improved today, no vomiting overnight.  - continue phenergan suppositories, Zofran IV  - tolerating Reglan IV, helping more than zofran  - D5NS with KCl to run after daily multivitamin bag  - Resume complete bowel rest        Hypokalemia- (present on  admission)   Assessment & Plan    From GI losses. Not able to tolerate PO repletion. Magnesium levels normal, K maintaining at 3.1.  - monitor and replete as able   - Continue with potassium in the multivitamin bag and continuous fluids        Pregnancy- (present on admission)   Assessment & Plan    1st trimester of 1st pregnancy. Update outpatient Dr. Treva turner [phone]    Abd US 2/3/18-  Single intrauterine pregnancy of an estimated gestational age of 13 weeks, 2 days with an estimated date of delivery of 8/8/2018. No clinical AMINA is noted.  Fetal survey within normal limits.  No perigestational hemorrhage or free fluid.        UTI (urinary tract infection) during pregnancy- (present on admission)   Assessment & Plan    Has urinalysis that was a dirty catch but did have packed white blood cells. Repeat urinalysis with large leuk esterase and WBC. Tachycardia but afebrile and leukocytosis resolved. WBC 10.9 --> 5.9  - Status post 5 days of antibiotics               Reviewed items::  Labs reviewed and Medications reviewed  Tafoya catheter::  No Tafoya  DVT prophylaxis - mechanical:  SCDs  Ulcer Prophylaxis::  Not indicated

## 2018-02-03 NOTE — PROGRESS NOTES
Met with pt at bedside report.  Pt denies pain, feelings of nausea at this time.  Bed in low position, nurse call light at pt's side.

## 2018-02-04 LAB
ANION GAP SERPL CALC-SCNC: 8 MMOL/L (ref 0–11.9)
BUN SERPL-MCNC: <3 MG/DL (ref 8–22)
CALCIUM SERPL-MCNC: 8.5 MG/DL (ref 8.5–10.5)
CHLORIDE SERPL-SCNC: 106 MMOL/L (ref 96–112)
CO2 SERPL-SCNC: 21 MMOL/L (ref 20–33)
CREAT SERPL-MCNC: 0.26 MG/DL (ref 0.5–1.4)
ERYTHROCYTE [DISTWIDTH] IN BLOOD BY AUTOMATED COUNT: 50.4 FL (ref 35.9–50)
GLUCOSE SERPL-MCNC: 78 MG/DL (ref 65–99)
HCT VFR BLD AUTO: 39.8 % (ref 37–47)
HGB BLD-MCNC: 12.9 G/DL (ref 12–16)
MAGNESIUM SERPL-MCNC: 1.5 MG/DL (ref 1.5–2.5)
MCH RBC QN AUTO: 26.1 PG (ref 27–33)
MCHC RBC AUTO-ENTMCNC: 32.4 G/DL (ref 33.6–35)
MCV RBC AUTO: 80.4 FL (ref 81.4–97.8)
PLATELET # BLD AUTO: 212 K/UL (ref 164–446)
PMV BLD AUTO: 11 FL (ref 9–12.9)
POTASSIUM SERPL-SCNC: 3.1 MMOL/L (ref 3.6–5.5)
RBC # BLD AUTO: 4.95 M/UL (ref 4.2–5.4)
SODIUM SERPL-SCNC: 135 MMOL/L (ref 135–145)
WBC # BLD AUTO: 6.1 K/UL (ref 4.8–10.8)

## 2018-02-04 PROCEDURE — 80048 BASIC METABOLIC PNL TOTAL CA: CPT

## 2018-02-04 PROCEDURE — 85027 COMPLETE CBC AUTOMATED: CPT

## 2018-02-04 PROCEDURE — 36415 COLL VENOUS BLD VENIPUNCTURE: CPT

## 2018-02-04 PROCEDURE — 700101 HCHG RX REV CODE 250: Performed by: INTERNAL MEDICINE

## 2018-02-04 PROCEDURE — 700105 HCHG RX REV CODE 258: Performed by: HOSPITALIST

## 2018-02-04 PROCEDURE — 83735 ASSAY OF MAGNESIUM: CPT

## 2018-02-04 PROCEDURE — 99232 SBSQ HOSP IP/OBS MODERATE 35: CPT | Performed by: HOSPITALIST

## 2018-02-04 PROCEDURE — 700105 HCHG RX REV CODE 258: Performed by: INTERNAL MEDICINE

## 2018-02-04 PROCEDURE — 700111 HCHG RX REV CODE 636 W/ 250 OVERRIDE (IP): Performed by: HOSPITALIST

## 2018-02-04 PROCEDURE — 700111 HCHG RX REV CODE 636 W/ 250 OVERRIDE (IP): Performed by: INTERNAL MEDICINE

## 2018-02-04 PROCEDURE — 770020 HCHG ROOM/CARE - TELE (206)

## 2018-02-04 RX ORDER — MAGNESIUM SULFATE HEPTAHYDRATE 40 MG/ML
2 INJECTION, SOLUTION INTRAVENOUS ONCE
Status: COMPLETED | OUTPATIENT
Start: 2018-02-04 | End: 2018-02-04

## 2018-02-04 RX ADMIN — POTASSIUM CHLORIDE: 2 INJECTION, SOLUTION, CONCENTRATE INTRAVENOUS at 11:03

## 2018-02-04 RX ADMIN — METOCLOPRAMIDE 10 MG: 5 INJECTION, SOLUTION INTRAMUSCULAR; INTRAVENOUS at 08:47

## 2018-02-04 RX ADMIN — POTASSIUM CHLORIDE: 2 INJECTION, SOLUTION, CONCENTRATE INTRAVENOUS at 22:05

## 2018-02-04 RX ADMIN — MAGNESIUM SULFATE IN WATER 2 G: 40 INJECTION, SOLUTION INTRAVENOUS at 08:47

## 2018-02-04 ASSESSMENT — ENCOUNTER SYMPTOMS
PALPITATIONS: 0
EYE REDNESS: 0
EYE PAIN: 0
NAUSEA: 1
FEVER: 0
VOMITING: 0
FALLS: 0
CONSTIPATION: 1
PSYCHIATRIC NEGATIVE: 1
LOSS OF CONSCIOUSNESS: 0
ABDOMINAL PAIN: 0
DIZZINESS: 0
COUGH: 0
CHILLS: 0
HEADACHES: 0
WEAKNESS: 0
SHORTNESS OF BREATH: 0

## 2018-02-04 ASSESSMENT — PATIENT HEALTH QUESTIONNAIRE - PHQ9
SUM OF ALL RESPONSES TO PHQ9 QUESTIONS 1 AND 2: 0
SUM OF ALL RESPONSES TO PHQ QUESTIONS 1-9: 0
2. FEELING DOWN, DEPRESSED, IRRITABLE, OR HOPELESS: NOT AT ALL
1. LITTLE INTEREST OR PLEASURE IN DOING THINGS: NOT AT ALL

## 2018-02-04 ASSESSMENT — PAIN SCALES - GENERAL
PAINLEVEL_OUTOF10: 0

## 2018-02-04 ASSESSMENT — LIFESTYLE VARIABLES: DO YOU DRINK ALCOHOL: NO

## 2018-02-04 NOTE — DISCHARGE PLANNING
Medical SW    Referral: Sw attended IDT rounds.    Intervention: Md indicates pt will most likely d/c home w/o any social needs.    Plan: Sw to assist w/ d/c planning as needed.

## 2018-02-04 NOTE — PROGRESS NOTES
Assumed care of patient bedside report received from LORETTA Castillo updated on POC, call light within reach and fall precautions in place. Bed locked and in lowest position. Patient instructed to call for assistance before getting out of bed. All questions answered, no other needs at this time.

## 2018-02-04 NOTE — PROGRESS NOTES
[facility] Hospitalist Progress Note    Date of Service: 2018    Chief Complaint  22 y.o. female admitted 2018 with N/V x5 days with progressive worsening. She had been evaluated and urgent care but zofran prescribed didn't give her any relief. LMP 17 with AMINA 18.    Interval Problem Update  Feeling slightly better. Still with nausea, especially with an ice chips she had previously. No episodes of vomiting x24 hours. No acute overnight events.    Consultants/Specialty  Recommendations from obDr. Treva tunrer    Disposition  When able to tolerate diet.        Review of Systems   Constitutional: Positive for malaise/fatigue. Negative for chills and fever.   HENT: Negative for congestion and hearing loss.    Eyes: Negative for pain and redness.   Respiratory: Negative for cough and shortness of breath.    Cardiovascular: Negative for chest pain and palpitations.   Gastrointestinal: Positive for constipation and nausea. Negative for abdominal pain (cramping improved) and vomiting.   Genitourinary: Negative for dysuria.   Musculoskeletal: Negative for falls.   Skin: Negative for rash.   Neurological: Negative for dizziness, loss of consciousness, weakness and headaches.   Psychiatric/Behavioral: Negative.    All other systems reviewed and are negative.     Physical Exam  Laboratory/Imaging   Hemodynamics  Temp (24hrs), Av.3 °C (97.4 °F), Min:36.1 °C (97 °F), Max:36.6 °C (97.9 °F)   Temperature: 36.2 °C (97.1 °F)  Pulse  Av  Min: 74  Max: 124    Blood Pressure: 106/75      Respiratory      Respiration: 16, Pulse Oximetry: 92 %             Fluids  No intake or output data in the 24 hours ending 18 0921    Nutrition  Orders Placed This Encounter   Procedures   • DIET NPO     Standing Status:   Standing     Number of Occurrences:   1     Order Specific Question:   Restrict to:     Answer:   Sips with Medications [3]     Physical Exam   Constitutional: She is oriented to person, place, and  time. She appears well-developed. No distress.   HENT:   Mouth/Throat: Mucous membranes are dry.   Eyes: EOM are normal. No scleral icterus.   Neck: Normal range of motion. Neck supple.   Cardiovascular: Normal rate and regular rhythm.    Pulmonary/Chest: Breath sounds normal. No respiratory distress. She has no rales.   Abdominal: Soft. Bowel sounds are normal. She exhibits no distension. There is no tenderness. There is no guarding.   Musculoskeletal: She exhibits no edema.   Neurological: She is alert and oriented to person, place, and time.   Skin: Skin is warm.   Psychiatric: She has a normal mood and affect. Her behavior is normal.   Vitals reviewed.      Recent Labs      02/02/18 0224 02/03/18 0409 02/04/18   0254   WBC  6.3  5.9  6.1   RBC  5.29  5.14  4.95   HEMOGLOBIN  13.3  13.5  12.9   HEMATOCRIT  42.6  41.1  39.8   MCV  80.5*  80.0*  80.4*   MCH  25.1*  26.3*  26.1*   MCHC  31.2*  32.8*  32.4*   RDW  51.5*  50.9*  50.4*   PLATELETCT  231  224  212   MPV  10.9  11.1  11.0     Recent Labs      02/02/18 0224 02/03/18 0409 02/04/18   0254   SODIUM  142  138  135   POTASSIUM  3.1*  3.1*  3.1*   CHLORIDE  111  108  106   CO2  22  22  21   GLUCOSE  102*  92  78   BUN  <3*  <3*  <3*   CREATININE  0.32*  0.35*  0.26*   CALCIUM  8.3*  8.7  8.5                      Assessment/Plan     * Hyperemesis gravidarum- (present on admission)   Assessment & Plan    Discussed with her outpatient ObGyn who explained that it typically resolves after the 16th week and if continues patient may require TPN. Conservative management. Continuing to improve, no vomiting x24 hours.  - continue phenergan suppositories, Zofran IV  - tolerating Reglan IV, helping more than zofran  - D5NS with KCl to run after daily multivitamin bag  - complete bowel rest  - if no vomiting today, will trial clears tomorrow        Hypokalemia- (present on admission)   Assessment & Plan    From GI losses. Not able to tolerate PO repletion.  Magnesium down to 1.5, K maintaining at 3.1.  - monitor and replete as able   - continue with potassium in the multivitamin bag and continuous fluids        Pregnancy- (present on admission)   Assessment & Plan    1st pregnancy. Update outpatient Dr. Treva turner [phone]    Abd US 2/3/18-  Single intrauterine pregnancy of an estimated gestational age of 13 weeks, 2 days with an estimated date of delivery of 8/8/2018. No clinical AMINA is noted.  Fetal survey within normal limits.  No perigestational hemorrhage or free fluid.        UTI (urinary tract infection) during pregnancy- (present on admission)   Assessment & Plan    Has urinalysis that was a dirty catch but did have packed white blood cells. Repeat urinalysis with large leuk esterase and WBC. Tachycardia but afebrile and leukocytosis resolved. WBC 10.9 --> 5.9  - S/p 5 days of antibiotics               Reviewed items::  Labs reviewed and Medications reviewed  Tafoya catheter::  No Tafoya  DVT prophylaxis - mechanical:  SCDs  Ulcer Prophylaxis::  Not indicated

## 2018-02-05 LAB
ANION GAP SERPL CALC-SCNC: 7 MMOL/L (ref 0–11.9)
BUN SERPL-MCNC: <3 MG/DL (ref 8–22)
CALCIUM SERPL-MCNC: 8.9 MG/DL (ref 8.5–10.5)
CHLORIDE SERPL-SCNC: 106 MMOL/L (ref 96–112)
CO2 SERPL-SCNC: 22 MMOL/L (ref 20–33)
CREAT SERPL-MCNC: 0.32 MG/DL (ref 0.5–1.4)
ERYTHROCYTE [DISTWIDTH] IN BLOOD BY AUTOMATED COUNT: 51 FL (ref 35.9–50)
GLUCOSE SERPL-MCNC: 77 MG/DL (ref 65–99)
HCT VFR BLD AUTO: 39.3 % (ref 37–47)
HGB BLD-MCNC: 13.3 G/DL (ref 12–16)
MAGNESIUM SERPL-MCNC: 1.8 MG/DL (ref 1.5–2.5)
MCH RBC QN AUTO: 27.3 PG (ref 27–33)
MCHC RBC AUTO-ENTMCNC: 33.8 G/DL (ref 33.6–35)
MCV RBC AUTO: 80.5 FL (ref 81.4–97.8)
PLATELET # BLD AUTO: 214 K/UL (ref 164–446)
PMV BLD AUTO: 11.4 FL (ref 9–12.9)
POTASSIUM SERPL-SCNC: 3.4 MMOL/L (ref 3.6–5.5)
RBC # BLD AUTO: 4.88 M/UL (ref 4.2–5.4)
SODIUM SERPL-SCNC: 135 MMOL/L (ref 135–145)
WBC # BLD AUTO: 6.3 K/UL (ref 4.8–10.8)

## 2018-02-05 PROCEDURE — 700111 HCHG RX REV CODE 636 W/ 250 OVERRIDE (IP): Performed by: INTERNAL MEDICINE

## 2018-02-05 PROCEDURE — 770020 HCHG ROOM/CARE - TELE (206)

## 2018-02-05 PROCEDURE — 80048 BASIC METABOLIC PNL TOTAL CA: CPT

## 2018-02-05 PROCEDURE — 700105 HCHG RX REV CODE 258: Performed by: INTERNAL MEDICINE

## 2018-02-05 PROCEDURE — 36415 COLL VENOUS BLD VENIPUNCTURE: CPT

## 2018-02-05 PROCEDURE — 700101 HCHG RX REV CODE 250: Performed by: INTERNAL MEDICINE

## 2018-02-05 PROCEDURE — 700105 HCHG RX REV CODE 258: Performed by: HOSPITALIST

## 2018-02-05 PROCEDURE — 83735 ASSAY OF MAGNESIUM: CPT

## 2018-02-05 PROCEDURE — 700111 HCHG RX REV CODE 636 W/ 250 OVERRIDE (IP): Performed by: HOSPITALIST

## 2018-02-05 PROCEDURE — 85027 COMPLETE CBC AUTOMATED: CPT

## 2018-02-05 PROCEDURE — 99232 SBSQ HOSP IP/OBS MODERATE 35: CPT | Performed by: HOSPITALIST

## 2018-02-05 RX ORDER — MAGNESIUM SULFATE HEPTAHYDRATE 40 MG/ML
2 INJECTION, SOLUTION INTRAVENOUS ONCE
Status: COMPLETED | OUTPATIENT
Start: 2018-02-05 | End: 2018-02-05

## 2018-02-05 RX ADMIN — POTASSIUM CHLORIDE: 2 INJECTION, SOLUTION, CONCENTRATE INTRAVENOUS at 19:52

## 2018-02-05 RX ADMIN — MAGNESIUM SULFATE IN WATER 2 G: 40 INJECTION, SOLUTION INTRAVENOUS at 09:40

## 2018-02-05 RX ADMIN — METOCLOPRAMIDE 10 MG: 5 INJECTION, SOLUTION INTRAMUSCULAR; INTRAVENOUS at 18:01

## 2018-02-05 RX ADMIN — METOCLOPRAMIDE 10 MG: 5 INJECTION, SOLUTION INTRAMUSCULAR; INTRAVENOUS at 03:57

## 2018-02-05 RX ADMIN — METOCLOPRAMIDE 10 MG: 5 INJECTION, SOLUTION INTRAMUSCULAR; INTRAVENOUS at 10:47

## 2018-02-05 RX ADMIN — POTASSIUM CHLORIDE: 2 INJECTION, SOLUTION, CONCENTRATE INTRAVENOUS at 09:40

## 2018-02-05 ASSESSMENT — ENCOUNTER SYMPTOMS
ABDOMINAL PAIN: 0
FALLS: 0
EYE PAIN: 0
WEAKNESS: 0
CONSTIPATION: 0
LOSS OF CONSCIOUSNESS: 0
HEADACHES: 0
EYE REDNESS: 0
DIZZINESS: 0
COUGH: 0
NAUSEA: 1
CHILLS: 0
SHORTNESS OF BREATH: 0
PALPITATIONS: 0
FEVER: 0
VOMITING: 0
PSYCHIATRIC NEGATIVE: 1

## 2018-02-05 ASSESSMENT — PAIN SCALES - GENERAL
PAINLEVEL_OUTOF10: 0

## 2018-02-05 NOTE — PROGRESS NOTES
Pt woke up suddenly and vomited. Pt states that she didn't feel nauseous all night. Gave pt Reglan, per MAR.

## 2018-02-05 NOTE — PROGRESS NOTES
Received bedside report, pt a/o x 4, no complaints.  Bed in low, position, wheels locked, call light at pt's side.

## 2018-02-05 NOTE — CARE PLAN
Problem: Infection  Goal: Will remain free from infection  Outcome: PROGRESSING AS EXPECTED  Scrubbed the hub for 15 seconds before accessing. Continuously monitoring pt for signs of infections.     Problem: Knowledge Deficit  Goal: Knowledge of disease process/condition, treatment plan, diagnostic tests, and medications will improve  Outcome: PROGRESSING AS EXPECTED  Discussed POC with pt. Answered all questions appropriately. White board updated. All medications and interventions explained before performed. Pt verbalized understanding.

## 2018-02-05 NOTE — PROGRESS NOTES
[facility] Hospitalist Progress Note    Date of Service: 2018    Chief Complaint  22 y.o. female admitted 2018 with N/V x5 days with progressive worsening. She had been evaluated and urgent care but zofran prescribed didn't give her any relief. LMP 17 with AMINA 18.    Interval Problem Update  Feeling better today, more energy. Did wake up in the early morning and suddenly vomited but feels well now. Did well with ice chips. Counseled again that she'll need reglan more regularly, only 1 dose yesterday morning.    Consultants/Specialty  Recommendations from obgyn, Dr. Tilley    Disposition  D/c home when able to tolerate diet.        Review of Systems   Constitutional: Positive for malaise/fatigue. Negative for chills and fever.   HENT: Negative for congestion and hearing loss.    Eyes: Negative for pain and redness.   Respiratory: Negative for cough and shortness of breath.    Cardiovascular: Negative for chest pain and palpitations.   Gastrointestinal: Positive for nausea. Negative for abdominal pain, constipation and vomiting.   Genitourinary: Negative for dysuria.   Musculoskeletal: Negative for falls.   Skin: Negative for rash.   Neurological: Negative for dizziness, loss of consciousness, weakness and headaches.   Psychiatric/Behavioral: Negative.    All other systems reviewed and are negative.     Physical Exam  Laboratory/Imaging   Hemodynamics  Temp (24hrs), Av.6 °C (97.8 °F), Min:36.3 °C (97.3 °F), Max:37 °C (98.6 °F)   Temperature: 37 °C (98.6 °F)  Pulse  Av.8  Min: 74  Max: 124    Blood Pressure: 108/66      Respiratory      Respiration: 18, Pulse Oximetry: 92 %             Fluids  No intake or output data in the 24 hours ending 18 1013    Nutrition  Orders Placed This Encounter   Procedures   • DIET ORDER     Standing Status:   Standing     Number of Occurrences:   1     Order Specific Question:   Diet:     Answer:   Clear Liquids - No Red Foods [12]     Physical Exam    Constitutional: She is oriented to person, place, and time. She appears well-developed. No distress.   HENT:   Mouth/Throat: Mucous membranes are dry.   Eyes: EOM are normal. No scleral icterus.   Neck: Normal range of motion. Neck supple.   Cardiovascular: Normal rate and regular rhythm.    Pulmonary/Chest: Breath sounds normal. No respiratory distress. She has no rales.   Abdominal: Soft. She exhibits no distension. There is no tenderness. There is no guarding.   Musculoskeletal: She exhibits no edema.   Neurological: She is alert and oriented to person, place, and time.   Skin: Skin is warm.   Psychiatric: She has a normal mood and affect. Her behavior is normal.   Vitals reviewed.      Recent Labs      02/03/18   0409 02/04/18 0254 02/05/18 0259   WBC  5.9  6.1  6.3   RBC  5.14  4.95  4.88   HEMOGLOBIN  13.5  12.9  13.3   HEMATOCRIT  41.1  39.8  39.3   MCV  80.0*  80.4*  80.5*   MCH  26.3*  26.1*  27.3   MCHC  32.8*  32.4*  33.8   RDW  50.9*  50.4*  51.0*   PLATELETCT  224  212  214   MPV  11.1  11.0  11.4     Recent Labs      02/03/18 0409 02/04/18 0254 02/05/18 0259   SODIUM  138  135  135   POTASSIUM  3.1*  3.1*  3.4*   CHLORIDE  108  106  106   CO2  22  21  22   GLUCOSE  92  78  77   BUN  <3*  <3*  <3*   CREATININE  0.35*  0.26*  0.32*   CALCIUM  8.7  8.5  8.9                      Assessment/Plan     * Hyperemesis gravidarum- (present on admission)   Assessment & Plan    Discussed with her outpatient ObGyn who explained that it typically resolves after the 16th week and if continues patient may require TPN. Conservative management. Continuing to improve, but did have episode of vomiting this AM (suddenly when woke up).  - continue phenergan suppositories, Reglan > Zofran IV  - D5NS with KCl to run after daily multivitamin bag  - s/p complete bowel rest x48 hours  - trial clear liquids today        Hypokalemia- (present on admission)   Assessment & Plan    From GI losses. Not able to tolerate  PO repletion. Now improving, Mg 1.8 and K 3.4.  - monitor and replete as able   - continue with potassium in the multivitamin bag and continuous fluids        Pregnancy- (present on admission)   Assessment & Plan    1st pregnancy. Update outpatient Dr. Treva turner [phone]    Abd US 2/3/18-  Single intrauterine pregnancy of an estimated gestational age of 13 weeks, 2 days with an estimated date of delivery of 8/8/2018. No clinical AMINA is noted.  Fetal survey within normal limits.  No perigestational hemorrhage or free fluid.        UTI (urinary tract infection) during pregnancy- (present on admission)   Assessment & Plan    Has urinalysis that was a dirty catch but did have packed white blood cells. Repeat urinalysis with large leuk esterase and WBC. Tachycardia but afebrile. Mild leukocytosis of 10.9 resolved.  - S/p 5 days of antibiotics               Reviewed items::  Labs reviewed and Medications reviewed  Tafoya catheter::  No Tafoya  DVT prophylaxis - mechanical:  SCDs  Ulcer Prophylaxis::  Not indicated

## 2018-02-05 NOTE — PROGRESS NOTES
Bedside report received. Patient A&O x4. ANNABELLA.   on the monitor.  Pt become tachycardic up to 170s with ambulation. Pt is to advance on clear liquid diet as tolerated.  POC discussed with patient. Patient verbalized understanding. Call light and belongings within reach. Bed locked and in lowest position, alarm and fall precautions in place.

## 2018-02-06 PROBLEM — R11.2 INTRACTABLE NAUSEA AND VOMITING: Status: ACTIVE | Noted: 2018-02-06

## 2018-02-06 LAB
ANION GAP SERPL CALC-SCNC: 9 MMOL/L (ref 0–11.9)
BUN SERPL-MCNC: <3 MG/DL (ref 8–22)
CALCIUM SERPL-MCNC: 8.7 MG/DL (ref 8.5–10.5)
CHLORIDE SERPL-SCNC: 107 MMOL/L (ref 96–112)
CO2 SERPL-SCNC: 20 MMOL/L (ref 20–33)
CREAT SERPL-MCNC: 0.31 MG/DL (ref 0.5–1.4)
ERYTHROCYTE [DISTWIDTH] IN BLOOD BY AUTOMATED COUNT: 52.4 FL (ref 35.9–50)
GLUCOSE SERPL-MCNC: 83 MG/DL (ref 65–99)
HCT VFR BLD AUTO: 39.5 % (ref 37–47)
HGB BLD-MCNC: 12.6 G/DL (ref 12–16)
MAGNESIUM SERPL-MCNC: 1.9 MG/DL (ref 1.5–2.5)
MCH RBC QN AUTO: 25.7 PG (ref 27–33)
MCHC RBC AUTO-ENTMCNC: 31.9 G/DL (ref 33.6–35)
MCV RBC AUTO: 80.4 FL (ref 81.4–97.8)
PLATELET # BLD AUTO: 222 K/UL (ref 164–446)
PMV BLD AUTO: 11.4 FL (ref 9–12.9)
POTASSIUM SERPL-SCNC: 3.5 MMOL/L (ref 3.6–5.5)
RBC # BLD AUTO: 4.91 M/UL (ref 4.2–5.4)
SODIUM SERPL-SCNC: 136 MMOL/L (ref 135–145)
WBC # BLD AUTO: 5.7 K/UL (ref 4.8–10.8)

## 2018-02-06 PROCEDURE — 700101 HCHG RX REV CODE 250: Performed by: INTERNAL MEDICINE

## 2018-02-06 PROCEDURE — 700111 HCHG RX REV CODE 636 W/ 250 OVERRIDE (IP): Performed by: INTERNAL MEDICINE

## 2018-02-06 PROCEDURE — 770001 HCHG ROOM/CARE - MED/SURG/GYN PRIV*

## 2018-02-06 PROCEDURE — 700102 HCHG RX REV CODE 250 W/ 637 OVERRIDE(OP): Performed by: HOSPITALIST

## 2018-02-06 PROCEDURE — A9270 NON-COVERED ITEM OR SERVICE: HCPCS | Performed by: HOSPITALIST

## 2018-02-06 PROCEDURE — 99232 SBSQ HOSP IP/OBS MODERATE 35: CPT | Performed by: HOSPITALIST

## 2018-02-06 PROCEDURE — 80048 BASIC METABOLIC PNL TOTAL CA: CPT

## 2018-02-06 PROCEDURE — 700111 HCHG RX REV CODE 636 W/ 250 OVERRIDE (IP): Performed by: HOSPITALIST

## 2018-02-06 PROCEDURE — 36415 COLL VENOUS BLD VENIPUNCTURE: CPT

## 2018-02-06 PROCEDURE — 85027 COMPLETE CBC AUTOMATED: CPT

## 2018-02-06 PROCEDURE — 700105 HCHG RX REV CODE 258: Performed by: HOSPITALIST

## 2018-02-06 PROCEDURE — 700105 HCHG RX REV CODE 258: Performed by: INTERNAL MEDICINE

## 2018-02-06 PROCEDURE — 83735 ASSAY OF MAGNESIUM: CPT

## 2018-02-06 RX ORDER — POTASSIUM CHLORIDE 20 MEQ/1
20 TABLET, EXTENDED RELEASE ORAL DAILY
Status: DISCONTINUED | OUTPATIENT
Start: 2018-02-06 | End: 2018-02-07 | Stop reason: HOSPADM

## 2018-02-06 RX ORDER — METOCLOPRAMIDE 10 MG/1
5 TABLET ORAL
Status: DISCONTINUED | OUTPATIENT
Start: 2018-02-06 | End: 2018-02-07 | Stop reason: HOSPADM

## 2018-02-06 RX ADMIN — METOCLOPRAMIDE HYDROCHLORIDE 5 MG: 10 TABLET ORAL at 17:01

## 2018-02-06 RX ADMIN — METOCLOPRAMIDE HYDROCHLORIDE 5 MG: 10 TABLET ORAL at 11:20

## 2018-02-06 RX ADMIN — POTASSIUM CHLORIDE: 2 INJECTION, SOLUTION, CONCENTRATE INTRAVENOUS at 22:52

## 2018-02-06 RX ADMIN — POTASSIUM CHLORIDE 20 MEQ: 1500 TABLET, EXTENDED RELEASE ORAL at 09:30

## 2018-02-06 RX ADMIN — POTASSIUM CHLORIDE: 2 INJECTION, SOLUTION, CONCENTRATE INTRAVENOUS at 09:30

## 2018-02-06 RX ADMIN — MAGNESIUM GLUCONATE 500 MG ORAL TABLET 400 MG: 500 TABLET ORAL at 20:26

## 2018-02-06 ASSESSMENT — ENCOUNTER SYMPTOMS
WEAKNESS: 0
SHORTNESS OF BREATH: 0
COUGH: 0
ABDOMINAL PAIN: 0
EYE PAIN: 0
CHILLS: 0
DIZZINESS: 0
HEADACHES: 0
CONSTIPATION: 0
NAUSEA: 1
PSYCHIATRIC NEGATIVE: 1
EYE REDNESS: 0
PALPITATIONS: 0
LOSS OF CONSCIOUSNESS: 0
FEVER: 0
FALLS: 0
VOMITING: 0

## 2018-02-06 ASSESSMENT — PAIN SCALES - GENERAL
PAINLEVEL_OUTOF10: 0

## 2018-02-06 NOTE — PROGRESS NOTES
Assumed care of patient bedside report received from LORETTA West updated on POC, call light within reach and fall precautions in place. Bed locked and in lowest position. Patient instructed to call for assistance before getting out of bed. Pt ambulated to bathroom with standby assistance. Pt states that she is feeling nauseous but does not feel like vomiting and does not want more medication at this moment. All questions answered, no other needs at this time.

## 2018-02-06 NOTE — DIETARY
Nutrition services: Day 7 of admit.  21 yo female with admitting diagnosis: hyperemesis gravidarium. Admitted following 5 days of worsening N/V at 13 weeks, 2 days pregnancy. Pt has been NPO/clear liquids since admit.    Assessment/Evaluation:  1) pt with inadequate nutrition since admit - increased nutritional needs secondary to pregnancy. Pt would benefit from diet advancement. Discussed with RN and recommended diet advancement with nutrition representative to see pt daily for meal and snack preferences.  Meals can be customized to what pt feels she can tolerate.  6 small meals per day might be helpful.    2) scheduled reglan, K+, and senokot  3) IVF vitamins - potassium chloride, thiamine, B1, MVI, folic acid    Recommendations/Plan:  1) advance diet per pt tolerance and preference of food and beverage items provided.   2) Encourage intake of meals  3) Document intake of all meals as % taken in ADL's to provide interdisciplinary communication across all shifts   4) Monitor weight daily  5) Nutrition rep will continue to see patient for ongoing meal and snack preferences.     Discussed with RN and nutrition representative.

## 2018-02-06 NOTE — CARE PLAN
Problem: Communication  Goal: The ability to communicate needs accurately and effectively will improve  Outcome: PROGRESSING AS EXPECTED      Problem: Safety  Goal: Will remain free from injury  Outcome: PROGRESSING AS EXPECTED  Bedside table and call light are within reach. Bed is locked and in the lowest position. Treaded socks are on. Patient educated to call for assistance.     Problem: Infection  Goal: Will remain free from infection  Outcome: PROGRESSING AS EXPECTED  Scrubbed the hub for 15 seconds before accessing. Assess pt continuously for infection.

## 2018-02-06 NOTE — CARE PLAN
Problem: Communication  Goal: The ability to communicate needs accurately and effectively will improve  Outcome: PROGRESSING AS EXPECTED  Patient able to communicate appropriately     Problem: Discharge Barriers/Planning  Goal: Patient's continuum of care needs will be met  Outcome: PROGRESSING AS EXPECTED  Discharge pending patient's po intake     Problem: Fluid Volume:  Goal: Will maintain balanced intake and output  Outcome: PROGRESSING AS EXPECTED  Rally bags and maintenance IVF. Electrolyte repletion. Clear liquid diet, patient tolerating small amounts of po.

## 2018-02-06 NOTE — CARE PLAN
Problem: Nutritional:  Goal: Achieve adequate nutritional intake  Advance diet from clear liquids and patient will consume 50% of meals  Outcome: NOT MET

## 2018-02-07 VITALS
TEMPERATURE: 98.4 F | RESPIRATION RATE: 18 BRPM | HEART RATE: 105 BPM | BODY MASS INDEX: 31.25 KG/M2 | OXYGEN SATURATION: 97 % | HEIGHT: 63 IN | DIASTOLIC BLOOD PRESSURE: 69 MMHG | SYSTOLIC BLOOD PRESSURE: 110 MMHG | WEIGHT: 176.37 LBS

## 2018-02-07 PROBLEM — R11.2 INTRACTABLE NAUSEA AND VOMITING: Status: RESOLVED | Noted: 2018-02-06 | Resolved: 2018-02-07

## 2018-02-07 PROBLEM — O23.40 UTI (URINARY TRACT INFECTION) DURING PREGNANCY: Status: RESOLVED | Noted: 2018-01-30 | Resolved: 2018-02-07

## 2018-02-07 LAB
ANION GAP SERPL CALC-SCNC: 7 MMOL/L (ref 0–11.9)
BUN SERPL-MCNC: <3 MG/DL (ref 8–22)
CALCIUM SERPL-MCNC: 8.7 MG/DL (ref 8.5–10.5)
CHLORIDE SERPL-SCNC: 107 MMOL/L (ref 96–112)
CO2 SERPL-SCNC: 21 MMOL/L (ref 20–33)
CREAT SERPL-MCNC: 0.32 MG/DL (ref 0.5–1.4)
ERYTHROCYTE [DISTWIDTH] IN BLOOD BY AUTOMATED COUNT: 52.1 FL (ref 35.9–50)
GLUCOSE SERPL-MCNC: 75 MG/DL (ref 65–99)
HCT VFR BLD AUTO: 39 % (ref 37–47)
HGB BLD-MCNC: 12.9 G/DL (ref 12–16)
MAGNESIUM SERPL-MCNC: 1.7 MG/DL (ref 1.5–2.5)
MCH RBC QN AUTO: 26.5 PG (ref 27–33)
MCHC RBC AUTO-ENTMCNC: 33.1 G/DL (ref 33.6–35)
MCV RBC AUTO: 80.1 FL (ref 81.4–97.8)
PHOSPHATE SERPL-MCNC: 4.1 MG/DL (ref 2.5–4.5)
PLATELET # BLD AUTO: 214 K/UL (ref 164–446)
PMV BLD AUTO: 11.4 FL (ref 9–12.9)
POTASSIUM SERPL-SCNC: 3.6 MMOL/L (ref 3.6–5.5)
RBC # BLD AUTO: 4.87 M/UL (ref 4.2–5.4)
SODIUM SERPL-SCNC: 135 MMOL/L (ref 135–145)
WBC # BLD AUTO: 5.4 K/UL (ref 4.8–10.8)

## 2018-02-07 PROCEDURE — 700102 HCHG RX REV CODE 250 W/ 637 OVERRIDE(OP): Performed by: HOSPITALIST

## 2018-02-07 PROCEDURE — 84100 ASSAY OF PHOSPHORUS: CPT

## 2018-02-07 PROCEDURE — 700111 HCHG RX REV CODE 636 W/ 250 OVERRIDE (IP): Performed by: INTERNAL MEDICINE

## 2018-02-07 PROCEDURE — A9270 NON-COVERED ITEM OR SERVICE: HCPCS | Performed by: HOSPITALIST

## 2018-02-07 PROCEDURE — 700105 HCHG RX REV CODE 258: Performed by: INTERNAL MEDICINE

## 2018-02-07 PROCEDURE — 700101 HCHG RX REV CODE 250: Performed by: INTERNAL MEDICINE

## 2018-02-07 PROCEDURE — 83735 ASSAY OF MAGNESIUM: CPT

## 2018-02-07 PROCEDURE — 36415 COLL VENOUS BLD VENIPUNCTURE: CPT

## 2018-02-07 PROCEDURE — 80048 BASIC METABOLIC PNL TOTAL CA: CPT

## 2018-02-07 PROCEDURE — 85027 COMPLETE CBC AUTOMATED: CPT

## 2018-02-07 PROCEDURE — 99239 HOSP IP/OBS DSCHRG MGMT >30: CPT | Performed by: HOSPITALIST

## 2018-02-07 RX ORDER — METOCLOPRAMIDE 5 MG/1
5 TABLET ORAL 4 TIMES DAILY PRN
Qty: 20 TAB | Refills: 0 | Status: SHIPPED | OUTPATIENT
Start: 2018-02-07

## 2018-02-07 RX ORDER — PROMETHAZINE HYDROCHLORIDE 25 MG/1
25 SUPPOSITORY RECTAL EVERY 6 HOURS PRN
Qty: 10 SUPPOSITORY | Refills: 0 | Status: SHIPPED | OUTPATIENT
Start: 2018-02-07 | End: 2018-02-12

## 2018-02-07 RX ORDER — PROMETHAZINE HYDROCHLORIDE 25 MG/1
25 TABLET ORAL EVERY 6 HOURS PRN
Qty: 30 TAB | Refills: 0 | Status: SHIPPED | OUTPATIENT
Start: 2018-02-07

## 2018-02-07 RX ORDER — POTASSIUM CHLORIDE 20 MEQ/1
20 TABLET, EXTENDED RELEASE ORAL DAILY
Qty: 5 TAB | Refills: 0 | Status: ON HOLD | OUTPATIENT
Start: 2018-02-08 | End: 2018-02-18

## 2018-02-07 RX ORDER — METOCLOPRAMIDE 5 MG/1
5 TABLET ORAL
Qty: 9 TAB | Refills: 0 | Status: SHIPPED | OUTPATIENT
Start: 2018-02-07 | End: 2018-02-10

## 2018-02-07 RX ADMIN — POTASSIUM CHLORIDE 20 MEQ: 1500 TABLET, EXTENDED RELEASE ORAL at 07:41

## 2018-02-07 RX ADMIN — POTASSIUM CHLORIDE: 2 INJECTION, SOLUTION, CONCENTRATE INTRAVENOUS at 09:54

## 2018-02-07 RX ADMIN — METOCLOPRAMIDE HYDROCHLORIDE 5 MG: 10 TABLET ORAL at 16:02

## 2018-02-07 RX ADMIN — METOCLOPRAMIDE HYDROCHLORIDE 5 MG: 10 TABLET ORAL at 06:33

## 2018-02-07 RX ADMIN — METOCLOPRAMIDE HYDROCHLORIDE 5 MG: 10 TABLET ORAL at 11:29

## 2018-02-07 RX ADMIN — MAGNESIUM GLUCONATE 500 MG ORAL TABLET 400 MG: 500 TABLET ORAL at 07:41

## 2018-02-07 ASSESSMENT — ENCOUNTER SYMPTOMS
FEVER: 0
VOMITING: 0
CHILLS: 0
COUGH: 0
EYE REDNESS: 0
WEAKNESS: 0
CONSTIPATION: 0
DIZZINESS: 0
HEADACHES: 0
PALPITATIONS: 0
SHORTNESS OF BREATH: 0
PSYCHIATRIC NEGATIVE: 1
FALLS: 0
LOSS OF CONSCIOUSNESS: 0
EYE PAIN: 0
ABDOMINAL PAIN: 0

## 2018-02-07 ASSESSMENT — PAIN SCALES - GENERAL
PAINLEVEL_OUTOF10: 0

## 2018-02-07 NOTE — PROGRESS NOTES
Bedside report received, pt has no complaints at this time, updated on POC, call light in reach, bed locked and in lowest position

## 2018-02-07 NOTE — PROGRESS NOTES
[facility] Hospitalist Progress Note    Date of Service: 2018    Chief Complaint  22 y.o. female admitted 2018 with N/V x5 days with progressive worsening. She had been evaluated and urgent care but zofran prescribed didn't give her any relief. LMP 17 with AMINA 18.    Interval Problem Update  Patient seen and examined today.    Patient tolerating treatment and therapies.  All Data, Medication data reviewed.  Case discussed with nursing as available.  Plan of Care reviewed with patient and notified of changes.   Pt feels better, less vomiting, not much intake yet, other than liquids  Is visiting from [place]    Consultants/Specialty  Recommendations from yue, Dr. Tilley    Disposition  D/c home when able to tolerate diet.        Review of Systems   Constitutional: Positive for malaise/fatigue. Negative for chills and fever.   HENT: Negative for congestion and hearing loss.    Eyes: Negative for pain and redness.   Respiratory: Negative for cough and shortness of breath.    Cardiovascular: Negative for chest pain and palpitations.   Gastrointestinal: Positive for nausea. Negative for abdominal pain, constipation and vomiting.   Genitourinary: Negative for dysuria.   Musculoskeletal: Negative for falls.   Skin: Negative for rash.   Neurological: Negative for dizziness, loss of consciousness, weakness and headaches.   Psychiatric/Behavioral: Negative.    All other systems reviewed and are negative.     Physical Exam  Laboratory/Imaging   Hemodynamics  Temp (24hrs), Av.8 °C (98.2 °F), Min:36.5 °C (97.7 °F), Max:37.1 °C (98.7 °F)   Temperature: 36.5 °C (97.7 °F)  Pulse  Av.7  Min: 74  Max: 124    Blood Pressure: 105/72      Respiratory      Respiration: 18, Pulse Oximetry: 93 %             Fluids    Intake/Output Summary (Last 24 hours) at 18 1607  Last data filed at 18   Gross per 24 hour   Intake               60 ml   Output              610 ml   Net             -550 ml        Nutrition  Orders Placed This Encounter   Procedures   • DIET ORDER     Standing Status:   Standing     Number of Occurrences:   1     Order Specific Question:   Diet:     Answer:   Clear Liquids - No Red Foods [12]     Physical Exam   Constitutional: She is oriented to person, place, and time. She appears well-developed. No distress.   HENT:   Mouth/Throat: Mucous membranes are dry.   Eyes: EOM are normal. No scleral icterus.   Neck: Normal range of motion. Neck supple.   Cardiovascular: Normal rate and regular rhythm.    Pulmonary/Chest: Breath sounds normal. No respiratory distress. She has no rales.   Abdominal: Soft. She exhibits no distension. There is no tenderness. There is no guarding.   Musculoskeletal: She exhibits no edema.   Neurological: She is alert and oriented to person, place, and time.   Skin: Skin is warm.   Psychiatric: She has a normal mood and affect. Her behavior is normal.   Vitals reviewed.      Recent Labs      02/04/18   0254  02/05/18   0259  02/06/18   0250   WBC  6.1  6.3  5.7   RBC  4.95  4.88  4.91   HEMOGLOBIN  12.9  13.3  12.6   HEMATOCRIT  39.8  39.3  39.5   MCV  80.4*  80.5*  80.4*   MCH  26.1*  27.3  25.7*   MCHC  32.4*  33.8  31.9*   RDW  50.4*  51.0*  52.4*   PLATELETCT  212  214  222   MPV  11.0  11.4  11.4     Recent Labs      02/04/18   0254  02/05/18   0259  02/06/18   0250   SODIUM  135  135  136   POTASSIUM  3.1*  3.4*  3.5*   CHLORIDE  106  106  107   CO2  21  22  20   GLUCOSE  78  77  83   BUN  <3*  <3*  <3*   CREATININE  0.26*  0.32*  0.31*   CALCIUM  8.5  8.9  8.7                      Assessment/Plan     * Hyperemesis gravidarum- (present on admission)   Assessment & Plan    Discussed with her outpatient ObGyn who explained that it typically resolves after the 16th week   - continue phenergan suppositories, Reglan > Zofran IV  - s/p complete bowel rest x48 hours  - advance diet as tolerated  -scheduled reglan        Hypokalemia- (present on admission)    Assessment & Plan    From GI losses.   - monitor and replete as able           Pregnancy- (present on admission)   Assessment & Plan    1st pregnancy. Update outpatient obDr. Treva turner [phone]    Abd US 2/3/18-  Single intrauterine pregnancy of an estimated gestational age of 13 weeks, 2 days with an estimated date of delivery of 8/8/2018. No clinical AMINA is noted.  Fetal survey within normal limits.  No perigestational hemorrhage or free fluid.        UTI (urinary tract infection) during pregnancy- (present on admission)   Assessment & Plan    Has urinalysis that was a dirty catch but did have packed white blood cells. Repeat urinalysis with large leuk esterase and WBC. Tachycardia but afebrile. Mild leukocytosis of 10.9 resolved.  - S/p 5 days of antibiotics           Plan  Scheduled reglan  C/w IVF's  Advance diet  K replacement  See orders  Hope for home soon    Reviewed items::  Labs reviewed and Medications reviewed  Tafoya catheter::  No Tafoya  DVT prophylaxis - mechanical:  SCDs  Ulcer Prophylaxis::  Not indicated

## 2018-02-07 NOTE — DISCHARGE INSTRUCTIONS
Discharge Instructions    Discharged to home by car with relative. Discharged via walking, hospital escort: Yes.  Special equipment needed: Not Applicable    Be sure to schedule a follow-up appointment with your primary care doctor or any specialists as instructed.     Discharge Plan:   Influenza Vaccine Indication: Not indicated: Previously immunized this influenza season and > 8 years of age  Influenza Vaccine Given - only chart on this line when given: Influenza Vaccine Given (See MAR)    I understand that a diet low in cholesterol, fat, and sodium is recommended for good health. Unless I have been given specific instructions below for another diet, I accept this instruction as my diet prescription.   Other diet: Regular    Special Instructions: None    · Is patient discharged on Warfarin / Coumadin?   No     Depression / Suicide Risk    As you are discharged from this [facility] facility, it is important to learn how to keep safe from harming yourself.    Recognize the warning signs:  · Abrupt changes in personality, positive or negative- including increase in energy   · Giving away possessions  · Change in eating patterns- significant weight changes-  positive or negative  · Change in sleeping patterns- unable to sleep or sleeping all the time   · Unwillingness or inability to communicate  · Depression  · Unusual sadness, discouragement and loneliness  · Talk of wanting to die  · Neglect of personal appearance   · Rebelliousness- reckless behavior  · Withdrawal from people/activities they love  · Confusion- inability to concentrate     If you or a loved one observes any of these behaviors or has concerns about self-harm, here's what you can do:  · Talk about it- your feelings and reasons for harming yourself  · Remove any means that you might use to hurt yourself (examples: pills, rope, extension cords, firearm)  · Get professional help from the community (Mental Health, Substance Abuse, psychological  counseling)  · Do not be alone:Call your Safe Contact- someone whom you trust who will be there for you.  · Call your local CRISIS HOTLINE 472-6808 or [phone]  · Call your local Children's Mobile Crisis Response Team [place] (096) 022-1220 or www.[employer]  · Call the toll free National Suicide Prevention Hotlines   · National Suicide Prevention Lifeline 337-454-JQMG (4334)  · National Hope Line Network 800-SUICIDE (627-7167)        Potassium Content of Foods  Potassium is a mineral found in many foods and drinks. It helps keep fluids and minerals balanced in your body and affects how steadily your heart beats. Potassium also helps control your blood pressure and keep your muscles and nervous system healthy.  Certain health conditions and medicines may change the balance of potassium in your body. When this happens, you can help balance your level of potassium through the foods that you do or do not eat. Your health care provider or dietitian may recommend an amount of potassium that you should have each day. The following lists of foods provide the amount of potassium (in parentheses) per serving in each item.  HIGH IN POTASSIUM   The following foods and beverages have 200 mg or more of potassium per serving:  · Apricots, 2 raw or 5 dry (200 mg).  · Artichoke, 1 medium (345 mg).  · Avocado, raw,  ¼ each (245 mg).  · Banana, 1 medium (425 mg).  · Beans, lima, or baked beans, canned, ½ cup (280 mg).  · Beans, white, canned, ½ cup (595 mg).  · Beef roast, 3 oz (320 mg).  · Beef, ground, 3 oz (270 mg).  · Beets, raw or cooked, ½ cup (260 mg).  · Bran muffin, 2 oz (300 mg).  · Broccoli, ½ cup (230 mg).  · Rickman sprouts, ½ cup (250 mg).  · Cantaloupe, ½ cup (215 mg).  · Cereal, 100% bran, ½ cup (200-400 mg).  · Cheeseburger, single, fast food, 1 each (225-400 mg).  · Chicken, 3 oz (220 mg).  · Clams, canned, 3 oz (535 mg).  · Crab, 3 oz (225 mg).  · Dates, 5 each (270 mg).  · Dried beans and peas, ½  cup (300-475 mg).  · Figs, dried, 2 each (260 mg).  · Fish: halibut, tuna, cod, snapper, 3 oz (480 mg).  · Fish: salmon, marcos, swordfish, perch, 3 oz (300 mg).  · Fish, tuna, canned 3 oz (200 mg).  · French fries, fast food, 3 oz (470 mg).  · Granola with fruit and nuts, ½ cup (200 mg).  · Grapefruit juice, ½ cup (200 mg).  · Greens, beet, ½ cup (655 mg).  · Honeydew melon, ½ cup (200 mg).  · Kale, raw, 1 cup (300 mg).  · Kiwi, 1 medium (240 mg).  · Kohlrabi, rutabaga, parsnips, ½ cup (280 mg).  · Lentils, ½ cup (365 mg).  · Benson, 1 each (325 mg).  · Milk, chocolate, 1 cup (420 mg).  · Milk: nonfat, low-fat, whole, buttermilk, 1 cup (350-380 mg).  · Molasses, 1 Tbsp (295 mg).  · Mushrooms, ½ cup (280) mg.  · Nectarine, 1 each (275 mg).  · Nuts: almonds, peanuts, hazelnuts, [place], cashew, mixed, 1 oz (200 mg).  · Nuts, pistachios, 1 oz (295 mg).  · Orange, 1 each (240 mg).  · Orange juice, ½ cup (235 mg).  · Papaya, medium, ½ fruit (390 mg).  · Peanut butter, chunky, 2 Tbsp (240 mg).  · Peanut butter, smooth, 2 Tbsp (210 mg).  · Pear, 1 medium (200 mg).  · Pomegranate, 1 whole (400 mg).  · Pomegranate juice, ½ cup (215 mg).  · Pork, 3 oz (350 mg).  · Potato chips, salted, 1 oz (465 mg).  · Potato, baked with skin, 1 medium (925 mg).  · Potatoes, boiled, ½ cup (255 mg).  · Potatoes, mashed, ½ cup (330 mg).  · Prune juice, ½ cup (370 mg).  · Prunes, 5 each (305 mg).  · Pudding, chocolate, ½ cup (230 mg).  · Pumpkin, canned, ½ cup (250 mg).  · Raisins, seedless, ¼ cup (270 mg).  · Seeds, sunflower or pumpkin, 1 oz (240 mg).  · Soy milk, 1 cup (300 mg).  · Spinach, ½ cup (420 mg).  · Spinach, canned, ½ cup (370 mg).  · Sweet potato, baked with skin, 1 medium (450 mg).  · Swiss chard, ½ cup (480 mg).  · Tomato or vegetable juice, ½ cup (275 mg).  · Tomato sauce or puree, ½ cup (400-550 mg).  · Tomato, raw, 1 medium (290 mg).  · Tomatoes, canned, ½ cup (200-300 mg).  · Turkey, 3 oz (250 mg).  · Wheat germ, 1 oz (250  mg).  · Winter squash, ½ cup (250 mg).  · Yogurt, plain or fruited, 6 oz (260-435 mg).  · Zucchini, ½ cup (220 mg).  MODERATE IN POTASSIUM  The following foods and beverages have  mg of potassium per serving:  · Apple, 1 each (150 mg).  · Apple juice, ½ cup (150 mg).  · Applesauce, ½ cup (90 mg).  · Apricot nectar, ½ cup (140 mg).  · Asparagus, small eng, ½ cup or 6 eng (155 mg).  · Bagel, cinnamon raisin, 1 each (130 mg).  · Bagel, egg or plain, 4 in., 1 each (70 mg).  · Beans, green, ½ cup (90 mg).  · Beans, yellow, ½ cup (190 mg).  · Beer, regular, 12 oz (100 mg).  · Beets, canned, ½ cup (125 mg).  · Blackberries, ½ cup (115 mg).  · Blueberries, ½ cup (60 mg).  · Bread, whole wheat, 1 slice (70 mg).  · Broccoli, raw, ½ cup (145 mg).  · Cabbage, ½ cup (150 mg).  · Carrots, cooked or raw, ½ cup (180 mg).  · Cauliflower, raw, ½ cup (150 mg).  · Celery, raw, ½ cup (155 mg).  · Cereal, bran flakes, ½cup (120-150 mg).  · Cheese, cottage, ½ cup (110 mg).  · Cherries, 10 each (150 mg).  · Chocolate, 1½ oz bar (165 mg).  · Coffee, brewed 6 oz (90 mg).  · Corn, ½ cup or 1 ear (195 mg).  · Cucumbers, ½ cup (80 mg).  · Egg, large, 1 each (60 mg).  · Eggplant, ½ cup (60 mg).  · Endive, raw, ½cup (80 mg).  · English muffin, 1 each (65 mg).  · Fish, orange roughy, 3 oz (150 mg).  · Frankfurter, beef or pork, 1 each (75 mg).  · Fruit cocktail, ½ cup (115 mg).  · Grape juice, ½ cup (170 mg).  · Grapefruit, ½ fruit (175 mg).  · Grapes, ½ cup (155 mg).  · Greens: kale, turnip, lucy, ½ cup (110-150 mg).  · Ice cream or frozen yogurt, chocolate, ½ cup (175 mg).  · Ice cream or frozen yogurt, vanilla, ½ cup (120-150 mg).  · Micheal, limes, 1 each (80 mg).  · Lettuce, all types, 1 cup (100 mg).  · Mixed vegetables, ½ cup (150 mg).  · Mushrooms, raw, ½ cup (110 mg).  · Nuts: walnuts, pecans, or macadamia, 1 oz (125 mg).  · Oatmeal, ½ cup (80 mg).  · Okra, ½ cup (110 mg).  · Onions, raw, ½ cup (120 mg).  · Peach, 1 each  (185 mg).  · Peaches, canned, ½ cup (120 mg).  · Pears, canned, ½ cup (120 mg).  · Peas, green, frozen, ½ cup (90 mg).  · Peppers, green, ½ cup (130 mg).  · Peppers, red, ½ cup (160 mg).  · Pineapple juice, ½ cup (165 mg).  · Pineapple, fresh or canned, ½ cup (100 mg).  · Plums, 1 each (105 mg).  · Pudding, vanilla, ½ cup (150 mg).  · Raspberries, ½ cup (90 mg).  · Rhubarb, ½ cup (115 mg).  · Rice, wild, ½ cup (80 mg).  · Shrimp, 3 oz (155 mg).  · Spinach, raw, 1 cup (170 mg).  · Strawberries, ½ cup (125 mg).  · Summer squash ½ cup (175-200 mg).  · Swiss chard, raw, 1 cup (135 mg).  · Tangerines, 1 each (140 mg).  · Tea, brewed, 6 oz (65 mg).  · Turnips, ½ cup (140 mg).  · Watermelon, ½ cup (85 mg).  · Wine, red, table, 5 oz (180 mg).  · Wine, white, table, 5 oz (100 mg).  LOW IN POTASSIUM  The following foods and beverages have less than 50 mg of potassium per serving.  · Bread, white, 1 slice (30 mg).  · Carbonated beverages, 12 oz (less than 5 mg).  · Cheese, 1 oz (20-30 mg).  · Cranberries, ½ cup (45 mg).  · Cranberry juice cocktail, ½ cup (20 mg).  · Fats and oils, 1 Tbsp (less than 5 mg).  · Hummus, 1 Tbsp (32 mg).  · Nectar: papaya, adán, or pear, ½ cup (35 mg).  · Rice, white or brown, ½ cup (50 mg).  · Spaghetti or macaroni, ½ cup cooked (30 mg).  · Tortilla, flour or corn, 1 each (50 mg).  · Waffle, 4 in., 1 each (50 mg).  · Water chestnuts, ½ cup (40 mg).     This information is not intended to replace advice given to you by your health care provider. Make sure you discuss any questions you have with your health care provider.     Document Released: 08/01/2006 Document Revised: 12/23/2014 Document Reviewed: 11/14/2014  [employer] Interactive Patient Education ©2016 [employer]        Promethazine oral solution or syrup  What is this medicine?  PROMETHAZINE (proe METH a zeen) is an antihistamine. It is used to treat allergic reactions and to treat or prevent nausea and vomiting from illness or motion  sickness. It is also used to make you sleep before surgery, and to help treat pain or nausea after surgery.  This medicine may be used for other purposes; ask your health care provider or pharmacist if you have questions.  COMMON BRAND NAME(S): Pentazine , Phenergan Fortis, Prometh Plain  What should I tell my health care provider before I take this medicine?  They need to know if you have any of these conditions:  -glaucoma  -high blood pressure or heart disease  -kidney disease  -liver disease  -lung or breathing disease, like asthma  -prostate trouble  -pain or difficulty passing urine  -seizures  -an unusual or allergic reaction to promethazine or phenothiazines, other medicines, foods, dyes, or preservatives  -pregnant or trying to get pregnant  -breast-feeding  How should I use this medicine?  Take this medicine by mouth. Follow the directions on the prescription label. Use a specially marked spoon or container to measure your medicine. Ask your pharmacist if you do not have one. Household spoons are not accurate. Take your doses at regular intervals. Do not take your medicine more often than directed.  Talk to your pediatrician regarding the use of this medicine in children. Special care may be needed. This medicine should not be given to infants and children younger than 2 years old.  Overdosage: If you think you have taken too much of this medicine contact a poison control center or emergency room at once.  NOTE: This medicine is only for you. Do not share this medicine with others.  What if I miss a dose?  If you miss a dose, take it as soon as you can. If it is almost time for your next dose, take only that dose. Do not take double or extra doses.  What may interact with this medicine?  Do not take this medicine with any of the following medications:  -medicines called MAO Inhibitors like Nardil, Parnate, Marplan, Eldepryl  -other phenothiazines like trimethobenzamide  This medicine may also interact with  the following medications:  -barbiturates like phenobarbital  -bromocriptine  -certain antidepressants  -certain antihistamines used in allergy or cold medicines  -epinephrine  -levodopa  -medicines for sleep  -medicines for mental problems and psychotic disturbances  -medicines for movement abnormalities as in Parkinson's disease, or for gastrointestinal problems  -muscle relaxants  -prescription pain medicines  This list may not describe all possible interactions. Give your health care provider a list of all the medicines, herbs, non-prescription drugs, or dietary supplements you use. Also tell them if you smoke, drink alcohol, or use illegal drugs. Some items may interact with your medicine.  What should I watch for while using this medicine?  Tell your doctor or health care professional if your symptoms do not start to get better in 1 to 2 days.  You may get drowsy or dizzy. Do not drive, use machinery, or do anything that needs mental alertness until you know how this medicine affects you. To reduce the risk of dizzy or fainting spells, do not stand or sit up quickly, especially if you are an older patient. Alcohol may increase dizziness and drowsiness. Avoid alcoholic drinks.  Your mouth may get dry. Chewing sugarless gum or sucking hard candy, and drinking plenty of water may help. Contact your doctor if the problem does not go away or is severe.  This medicine may cause dry eyes and blurred vision. If you wear contact lenses you may feel some discomfort. Lubricating drops may help. See your eye doctor if the problem does not go away or is severe.  This medicine can make you more sensitive to the sun. Keep out of the sun. If you cannot avoid being in the sun, wear protective clothing and use sunscreen. Do not use sun lamps or tanning beds/booths.  If you are diabetic, check your blood-sugar levels regularly.  What side effects may I notice from receiving this medicine?  Side effects that you should report to  your doctor or health care professional as soon as possible:  -blurred vision  -irregular heartbeat, palpitations or chest pain  -muscle or facial twitches  -pain or difficulty passing urine  -seizures  -skin rash  -slowed or shallow breathing  -unusual bleeding or bruising  -yellowing of the eyes or skin  Side effects that usually do not require medical attention (report to your doctor or health care professional if they continue or are bothersome):  -headache  -nightmares, agitation, nervousness, excitability, not able to sleep (these are more likely in children)  -stuffy nose  This list may not describe all possible side effects. Call your doctor for medical advice about side effects. You may report side effects to FDA at 2-967-FDA-9310.  Where should I keep my medicine?  Keep out of the reach of children.  Store at room temperature, between 15 and 25 degrees C (59 and 77 degrees F). Do not freeze Protect from light. Throw away any unused medicine after the expiration date.  NOTE: This sheet is a summary. It may not cover all possible information. If you have questions about this medicine, talk to your doctor, pharmacist, or health care provider.  © 2014, [employer]. (7/16/2009 12:06:44 PM)    Metoclopramide tablets  What is this medicine?  METOCLOPRAMIDE (met oh kloe PRA mide) is used to treat the symptoms of gastroesophageal reflux disease (GERD) like heartburn. It is also used to treat people with slow emptying of the stomach and intestinal tract.  This medicine may be used for other purposes; ask your health care provider or pharmacist if you have questions.  COMMON BRAND NAME(S): Daniella  What should I tell my health care provider before I take this medicine?  They need to know if you have any of these conditions:  -breast cancer  -depression  -diabetes  -heart failure  -high blood pressure  -kidney disease  -liver disease  -Parkinson's disease or a movement  disorder  -pheochromocytoma  -seizures  -stomach obstruction, bleeding, or perforation  -an unusual or allergic reaction to metoclopramide, procainamide, sulfites, other medicines, foods, dyes, or preservatives  -pregnant or trying to get pregnant  -breast-feeding  How should I use this medicine?  Take this medicine by mouth with a glass of water. Follow the directions on the prescription label. Take this medicine on an empty stomach, about 30 minutes before eating. Take your doses at regular intervals. Do not take your medicine more often than directed. Do not stop taking except on the advice of your doctor or health care professional.  A special MedGuide will be given to you by the pharmacist with each prescription and refill. Be sure to read this information carefully each time.  Talk to your pediatrician regarding the use of this medicine in children. Special care may be needed.  Overdosage: If you think you have taken too much of this medicine contact a poison control center or emergency room at once.  NOTE: This medicine is only for you. Do not share this medicine with others.  What if I miss a dose?  If you miss a dose, take it as soon as you can. If it is almost time for your next dose, take only that dose. Do not take double or extra doses.  What may interact with this medicine?  -acetaminophen  -cyclosporine  -digoxin  -medicines for blood pressure  -medicines for diabetes, including insulin  -medicines for hay fever and other allergies  -medicines for depression, especially an Monoamine Oxidase Inhibitor (MAOI)  -medicines for Parkinson's disease, like levodopa  -medicines for sleep or for pain  -tetracycline  This list may not describe all possible interactions. Give your health care provider a list of all the medicines, herbs, non-prescription drugs, or dietary supplements you use. Also tell them if you smoke, drink alcohol, or use illegal drugs. Some items may interact with your medicine.  What should  I watch for while using this medicine?  It may take a few weeks for your stomach condition to start to get better. However, do not take this medicine for longer than 12 weeks. The longer you take this medicine, and the more you take it, the greater your chances are of developing serious side effects. If you are an elderly patient, a female patient, or you have diabetes, you may be at an increased risk for side effects from this medicine. Contact your doctor immediately if you start having movements you cannot control such as lip smacking, rapid movements of the tongue, involuntary or uncontrollable movements of the eyes, head, arms and legs, or muscle twitches and spasms.  Patients and their families should watch out for worsening depression or thoughts of suicide. Also watch out for any sudden or severe changes in feelings such as feeling anxious, agitated, panicky, irritable, hostile, aggressive, impulsive, severely restless, overly excited and hyperactive, or not being able to sleep. If this happens, especially at the beginning of treatment or after a change in dose, call your doctor.  Do not treat yourself for high fever. Ask your doctor or health care professional for advice.  You may get drowsy or dizzy. Do not drive, use machinery, or do anything that needs mental alertness until you know how this drug affects you. Do not stand or sit up quickly, especially if you are an older patient. This reduces the risk of dizzy or fainting spells. Alcohol can make you more drowsy and dizzy. Avoid alcoholic drinks.  What side effects may I notice from receiving this medicine?  Side effects that you should report to your doctor or health care professional as soon as possible:  -allergic reactions like skin rash, itching or hives, swelling of the face, lips, or tongue  -abnormal production of milk in females  -breast enlargement in both males and females  -change in the way you walk  -difficulty moving, speaking or  swallowing  -drooling, lip smacking, or rapid movements of the tongue  -excessive sweating  -fever  -involuntary or uncontrollable movements of the eyes, head, arms and legs  -irregular heartbeat or palpitations  -muscle twitches and spasms  -unusually weak or tired  Side effects that usually do not require medical attention (report to your doctor or health care professional if they continue or are bothersome):  -change in sex drive or performance  -depressed mood  -diarrhea  -difficulty sleeping  -headache  -menstrual changes  -restless or nervous  This list may not describe all possible side effects. Call your doctor for medical advice about side effects. You may report side effects to FDA at 0-571-AJU-5347.  Where should I keep my medicine?  Keep out of the reach of children.  Store at room temperature between 20 and 25 degrees C (68 and 77 degrees F). Protect from light. Keep container tightly closed. Throw away any unused medicine after the expiration date.  NOTE: This sheet is a summary. It may not cover all possible information. If you have questions about this medicine, talk to your doctor, pharmacist, or health care provider.  © 2014, [employer]. (4/16/2013 1:04:38 PM)      Eating Plan for Hyperemesis Gravidarum  Severe cases of hyperemesis gravidarum can lead to dehydration and malnutrition. The hyperemesis eating plan is one way to lessen the symptoms of nausea and vomiting. It is often used with prescribed medicines to control your symptoms.   WHAT CAN I DO TO RELIEVE MY SYMPTOMS?  Listen to your body. Everyone is different and has different preferences. Find what works best for you. Some of the following things may help:  · Eat and drink slowly.  · Eat 5-6 small meals daily instead of 3 large meals.    · Eat crackers before you get out of bed in the morning.    · Starchy foods are usually well tolerated (such as cereal, toast, bread, potatoes, pasta, rice, and pretzels).    · Geovanna may help  with nausea. Add ¼ tsp ground lou to hot tea or choose lou tea.    · Try drinking 100% fruit juice or an electrolyte drink.  · Continue to take your prenatal vitamins as directed by your health care provider. If you are having trouble taking your prenatal vitamins, talk with your health care provider about different options.  · Include at least 1 serving of protein with your meals and snacks (such as meats or poultry, beans, nuts, eggs, or yogurt). Try eating a protein-rich snack before bed (such as cheese and crackers or a half turkey or peanut butter sandwich).  WHAT THINGS SHOULD I AVOID TO REDUCE MY SYMPTOMS?  The following things may help reduce your symptoms:  · Avoid foods with strong smells. Try eating meals in well-ventilated areas that are free of odors.  · Avoid drinking water or other beverages with meals. Try not to drink anything less than 30 minutes before and after meals.  · Avoid drinking more than 1 cup of fluid at a time.  · Avoid fried or high-fat foods, such as butter and cream sauces.  · Avoid spicy foods.  · Avoid skipping meals the best you can. Nausea can be more intense on an empty stomach. If you cannot tolerate food at that time, do not force it. Try sucking on ice chips or other frozen items and make up the calories later.  · Avoid lying down within 2 hours after eating.     This information is not intended to replace advice given to you by your health care provider. Make sure you discuss any questions you have with your health care provider.     Document Released: 10/14/2008 Document Revised: 12/23/2014 Document Reviewed: 10/22/2014  [employer] Interactive Patient Education ©2016 [employer]    Hyperemesis Gravidarum  Hyperemesis gravidarum is a severe form of nausea and vomiting that happens during pregnancy. Hyperemesis is worse than morning sickness. It may cause you to have nausea or vomiting all day for many days. It may keep you from eating and drinking enough food and  liquids. Hyperemesis usually occurs during the first half (the first 20 weeks) of pregnancy. It often goes away once a woman is in her second half of pregnancy. However, sometimes hyperemesis continues through an entire pregnancy.   CAUSES   The cause of this condition is not completely known but is thought to be related to changes in the body's hormones when pregnant. It could be from the high level of the pregnancy hormone or an increase in estrogen in the body.   SIGNS AND SYMPTOMS   · Severe nausea and vomiting.  · Nausea that does not go away.  · Vomiting that does not allow you to keep any food down.  · Weight loss and body fluid loss (dehydration).  · Having no desire to eat or not liking food you have previously enjoyed.  DIAGNOSIS   Your health care provider will do a physical exam and ask you about your symptoms. He or she may also order blood tests and urine tests to make sure something else is not causing the problem.   TREATMENT   You may only need medicine to control the problem. If medicines do not control the nausea and vomiting, you will be treated in the hospital to prevent dehydration, increased acid in the blood (acidosis), weight loss, and changes in the electrolytes in your body that may harm the unborn baby (fetus). You may need IV fluids.   HOME CARE INSTRUCTIONS   · Only take over-the-counter or prescription medicines as directed by your health care provider.  · Try eating a couple of dry crackers or toast in the morning before getting out of bed.  · Avoid foods and smells that upset your stomach.  · Avoid fatty and spicy foods.  · Eat 5-6 small meals a day.  · Do not drink when eating meals. Drink between meals.  · For snacks, eat high-protein foods, such as cheese.  · Eat or suck on things that have geovanna in them. Geovanna helps nausea.  · Avoid food preparation. The smell of food can spoil your appetite.  · Avoid iron pills and iron in your multivitamins until after 3-4 months of being  pregnant. However, consult with your health care provider before stopping any prescribed iron pills.  SEEK MEDICAL CARE IF:   · Your abdominal pain increases.  · You have a severe headache.  · You have vision problems.  · You are losing weight.  SEEK IMMEDIATE MEDICAL CARE IF:   · You are unable to keep fluids down.  · You vomit blood.  · You have constant nausea and vomiting.  · You have excessive weakness.  · You have extreme thirst.  · You have dizziness or fainting.  · You have a fever or persistent symptoms for more than 2-3 days.  · You have a fever and your symptoms suddenly get worse.  MAKE SURE YOU:   · Understand these instructions.  · Will watch your condition.  · Will get help right away if you are not doing well or get worse.     This information is not intended to replace advice given to you by your health care provider. Make sure you discuss any questions you have with your health care provider.     Document Released: 12/18/2006 Document Revised: 10/08/2014 Document Reviewed: 07/30/2014  [employer] Interactive Patient Education ©2016 [employer]

## 2018-02-07 NOTE — PROGRESS NOTES
Assumed pt care at 1900, bedside report received.  Pt in no distress.  Pt states no longer having any vomiting or nausea, pt able to tolerate dinner tonight.  No further complaints.  Bed locked in lowest position and call light within reach.  Will continue to monitor and follow plan of care.

## 2018-02-07 NOTE — PROGRESS NOTES
[facility] Hospitalist Progress Note    Date of Service: 2018    Chief Complaint  22 y.o. female admitted 2018 with N/V x5 days with progressive worsening. She had been evaluated and urgent care but zofran prescribed didn't give her any relief. LMP 17 with AMINA 18.    Interval Problem Update  Patient seen and examined today.    Patient tolerating treatment and therapies.  All Data, Medication data reviewed.  Case discussed with nursing as available.  Plan of Care reviewed with patient and notified of changes.  2/6 Pt feels better, less vomiting, not much intake yet, other than liquids  Is visiting from [place]  2/7 Pt better, able to eat and hydrate  Consultants/Specialty  Recommendations from obgyhenny, Dr. Tilley    Disposition  D/c home when able to tolerate diet.        Review of Systems   Constitutional: Negative for chills and fever.   HENT: Negative for congestion and hearing loss.    Eyes: Negative for pain and redness.   Respiratory: Negative for cough and shortness of breath.    Cardiovascular: Negative for chest pain and palpitations.   Gastrointestinal: Negative for abdominal pain, constipation and vomiting.   Genitourinary: Negative for dysuria.   Musculoskeletal: Negative for falls.   Skin: Negative for rash.   Neurological: Negative for dizziness, loss of consciousness, weakness and headaches.   Psychiatric/Behavioral: Negative.    All other systems reviewed and are negative.     Physical Exam  Laboratory/Imaging   Hemodynamics  Temp (24hrs), Av.6 °C (97.8 °F), Min:36.3 °C (97.4 °F), Max:36.9 °C (98.4 °F)   Temperature: 36.9 °C (98.4 °F)  Pulse  Av.9  Min: 74  Max: 124    Blood Pressure: 110/69      Respiratory      Respiration: 18, Pulse Oximetry: 97 %             Fluids  No intake or output data in the 24 hours ending 18 1121    Nutrition  Orders Placed This Encounter   Procedures   • DIET ORDER     Standing Status:   Standing     Number of Occurrences:   1     Order  Specific Question:   Diet:     Answer:   Full Liquid [11]     Physical Exam   Constitutional: She is oriented to person, place, and time. She appears well-developed. No distress.   HENT:   Mouth/Throat: Mucous membranes are not dry.   Eyes: EOM are normal. No scleral icterus.   Neck: Normal range of motion. Neck supple.   Cardiovascular: Normal rate and regular rhythm.    Pulmonary/Chest: Breath sounds normal. No respiratory distress. She has no rales.   Abdominal: Soft. She exhibits no distension. There is no tenderness. There is no guarding.   Musculoskeletal: She exhibits no edema.   Neurological: She is alert and oriented to person, place, and time.   Skin: Skin is warm.   Psychiatric: She has a normal mood and affect. Her behavior is normal.   Vitals reviewed.      Recent Labs      02/05/18   0259 02/06/18   0250 02/07/18   0257   WBC  6.3  5.7  5.4   RBC  4.88  4.91  4.87   HEMOGLOBIN  13.3  12.6  12.9   HEMATOCRIT  39.3  39.5  39.0   MCV  80.5*  80.4*  80.1*   MCH  27.3  25.7*  26.5*   MCHC  33.8  31.9*  33.1*   RDW  51.0*  52.4*  52.1*   PLATELETCT  214  222  214   MPV  11.4  11.4  11.4     Recent Labs      02/05/18   0259 02/06/18   0250  02/07/18   0257   SODIUM  135  136  135   POTASSIUM  3.4*  3.5*  3.6   CHLORIDE  106  107  107   CO2  22  20  21   GLUCOSE  77  83  75   BUN  <3*  <3*  <3*   CREATININE  0.32*  0.31*  0.32*   CALCIUM  8.9  8.7  8.7                      Assessment/Plan     * Hyperemesis gravidarum- (present on admission)   Assessment & Plan    Discussed with her outpatient ObGyn who explained that it typically resolves after the 16th week   - improved, ok for d/c        Hypokalemia- (present on admission)   Assessment & Plan    From GI losses.   - monitor and replete as able           Pregnancy- (present on admission)   Assessment & Plan    1st pregnancy. Update outpatient obDr. Treva turner [phone]    Abd US 2/3/18-  Single intrauterine pregnancy of an estimated gestational  age of 13 weeks, 2 days with an estimated date of delivery of 8/8/2018. No clinical AMINA is noted.  Fetal survey within normal limits.  No perigestational hemorrhage or free fluid.        UTI (urinary tract infection) during pregnancy- (present on admission)   Assessment & Plan    Has urinalysis that was a dirty catch but did have packed white blood cells. Repeat urinalysis with large leuk esterase and WBC. Tachycardia but afebrile. Mild leukocytosis of 10.9 resolved.  - S/p 5 days of antibiotics           Plan  Scheduled reglan for home then prn  Ok to d/c to home    Reviewed items::  Labs reviewed and Medications reviewed  Tafoya catheter::  No Tafoya  DVT prophylaxis - mechanical:  SCDs  Ulcer Prophylaxis::  Not indicated

## 2018-02-07 NOTE — CARE PLAN
Problem: Bowel/Gastric:  Goal: Will not experience complications related to bowel motility  Outcome: PROGRESSING SLOWER THAN EXPECTED  Pt unable to recall last bowel movement, denies any constipation and refuses stool softener    Problem: Knowledge Deficit  Goal: Knowledge of disease process/condition, treatment plan, diagnostic tests, and medications will improve    Intervention: Explain information regarding disease process/condition, treatment plan, diagnostic tests, and medications and document in education  Pt updated about plan of care, pt expresses verbal understanding

## 2018-02-07 NOTE — PROGRESS NOTES
Discharge Instructions    Discharged to home by car with relative. Discharged via wheelchair, hospital escort: Yes.  Special equipment needed: Not Applicable    Be sure to schedule a follow-up appointment with your primary care doctor or any specialists as instructed.     Discharge Plan:   Diet Plan: Discussed  Activity Level: Discussed  Confirmed Follow up Appointment: Appointment Scheduled  Confirmed Symptoms Management: Discussed  Medication Reconciliation Updated: Yes  Influenza Vaccine Indication: Not indicated: Child 6 months to 8 years of age received 2 influenza vaccines > or equal to 4 weeks apart during their 1st influenza vaccination season, Not indicated: Previously immunized this influenza season and > 8 years of age  Influenza Vaccine Given - only chart on this line when given: Influenza Vaccine Given (See MAR)    I understand that a diet low in cholesterol, fat, and sodium is recommended for good health. Unless I have been given specific instructions below for another diet, I accept this instruction as my diet prescription.   Other diet: Regular    Special Instructions: None    · Is patient discharged on Warfarin / Coumadin?   No     Depression / Suicide Risk    As you are discharged from this [facility] facility, it is important to learn how to keep safe from harming yourself.    Recognize the warning signs:  · Abrupt changes in personality, positive or negative- including increase in energy   · Giving away possessions  · Change in eating patterns- significant weight changes-  positive or negative  · Change in sleeping patterns- unable to sleep or sleeping all the time   · Unwillingness or inability to communicate  · Depression  · Unusual sadness, discouragement and loneliness  · Talk of wanting to die  · Neglect of personal appearance   · Rebelliousness- reckless behavior  · Withdrawal from people/activities they love  · Confusion- inability to concentrate     If you or a loved one observes any  of these behaviors or has concerns about self-harm, here's what you can do:  · Talk about it- your feelings and reasons for harming yourself  · Remove any means that you might use to hurt yourself (examples: pills, rope, extension cords, firearm)  · Get professional help from the community (Mental Health, Substance Abuse, psychological counseling)  · Do not be alone:Call your Safe Contact- someone whom you trust who will be there for you.  · Call your local CRISIS HOTLINE 153-0958 or 124-667-0788  · Call your local Children's Mobile Crisis Response Team [place] (526) 954-7580 or www.[employer]  · Call the toll free National Suicide Prevention Hotlines   · National Suicide Prevention Lifeline 547-277-UOJW (9071)  · National Hope Line Network 800-SUICIDE (974-1167)

## 2018-02-08 ENCOUNTER — PATIENT OUTREACH (OUTPATIENT)
Dept: HEALTH INFORMATION MANAGEMENT | Facility: OTHER | Age: 23
End: 2018-02-08

## 2018-02-08 NOTE — DISCHARGE SUMMARY
DATE OF ADMISSION:  01/29/2018.    DATE OF ADMISSION:  02/07/2018.    IDENTIFICATION:  A 22-year-old female visiting from [place].    DISCHARGE CONDITION:  Medically stable and improved.    DISCHARGE DISPOSITION:  To home with her mother.    DISCHARGE DIAGNOSES:  1.  Status post treatment for hyperemesis gravidarum.  The patient improved   with medical measures and currently taking oral nutrition and hydration well.    She is rehydrated.  2.  Transient hypokalemia from gastrointestinal losses, repleted.  3.  Pregnancy, the patient is followed by an outpatient OB/GYN had an   ultrasound showing a single intrauterine pregnancy of an estimated gestational   age of 13 weeks.  The patient otherwise is doing well.  4.  Status post mild urinary tract infection, treated with antibiotics   successfully.  5.  Dietary obesity.    DISCHARGE MEDICATIONS:  As follows:  1.  Reglan 5 mg 4 times daily p.r.n. nausea.  2.  Prior Zofran prescription for nausea and vomiting as needed.  3.  K-Dur 20 mEq p.o. x5 days daily, then discontinue.  4.  Phenergan 25 mg suppositories p.r.n.  5.  Phenergan 25 mg tablet q. 6 hours p.r.n.  6.  Vitamin B6 50 mg daily.    For presenting symptoms, HPI and physical findings, please refer to the   dictated H and P.    HOSPITAL COURSE:  The patient was admitted with hyperemesis gravidarum, unable   to keep medications down or hydration.  She had difficulties with her   electrolytes as well which was replenished and improved.  The patient overall   had an uncomplicated hospital course treated for urinary tract infection,   initially successfully with some broad spectrum antibiotics.  She at this time   is medically stable and improved for discharge.  She is able to eat and drink   regularly without difficulty.  Her potassium was still borderline low.    Therefore, ongoing potassium replacement for several days.  She has had her   questions answered.  She feels fine and will be discharged home into the  hands   of her mother with ongoing medical care back home in [place].  For full   further details, please refer to the computer system and paper chart.    Time spent on discharge is 45 minutes.       ____________________________________     MD URSZULA FAJARDO / VIKTORIYA    DD:  02/07/2018 14:19:47  DT:  02/08/2018 00:42:45    D#:  [id number]  Job#:  [id number]

## 2018-02-12 ENCOUNTER — PATIENT OUTREACH (OUTPATIENT)
Dept: HEALTH INFORMATION MANAGEMENT | Facility: OTHER | Age: 23
End: 2018-02-12

## 2018-02-12 ENCOUNTER — HOSPITAL ENCOUNTER (INPATIENT)
Facility: MEDICAL CENTER | Age: 23
LOS: 6 days | DRG: 781 | End: 2018-02-18
Attending: EMERGENCY MEDICINE | Admitting: HOSPITALIST
Payer: COMMERCIAL

## 2018-02-12 ENCOUNTER — RESOLUTE PROFESSIONAL BILLING HOSPITAL PROF FEE (OUTPATIENT)
Dept: HOSPITALIST | Facility: MEDICAL CENTER | Age: 23
End: 2018-02-12
Payer: COMMERCIAL

## 2018-02-12 PROBLEM — Z34.90 PREGNANT: Status: ACTIVE | Noted: 2018-02-12

## 2018-02-12 PROBLEM — D50.9 MICROCYTIC ANEMIA: Status: ACTIVE | Noted: 2018-02-12

## 2018-02-12 LAB
ALBUMIN SERPL BCP-MCNC: 4 G/DL (ref 3.2–4.9)
ALBUMIN/GLOB SERPL: 1 G/DL
ALP SERPL-CCNC: 76 U/L (ref 30–99)
ALT SERPL-CCNC: 55 U/L (ref 2–50)
AMORPH CRY #/AREA URNS HPF: PRESENT /HPF
ANION GAP SERPL CALC-SCNC: 20 MMOL/L (ref 0–11.9)
APPEARANCE UR: ABNORMAL
AST SERPL-CCNC: 65 U/L (ref 12–45)
BACTERIA #/AREA URNS HPF: ABNORMAL /HPF
BASOPHILS # BLD AUTO: 0.3 % (ref 0–1.8)
BASOPHILS # BLD: 0.04 K/UL (ref 0–0.12)
BILIRUB SERPL-MCNC: 1.3 MG/DL (ref 0.1–1.5)
BILIRUB UR QL STRIP.AUTO: ABNORMAL
BUN BLD-MCNC: 9 MG/DL (ref 8–22)
BUN SERPL-MCNC: 8 MG/DL (ref 8–22)
CALCIUM SERPL-MCNC: 10.9 MG/DL (ref 8.4–10.2)
CHLORIDE BLD-SCNC: 112 MMOL/L (ref 96–112)
CHLORIDE SERPL-SCNC: 106 MMOL/L (ref 96–112)
CO2 BLD-SCNC: 18 MMOL/L (ref 20–33)
CO2 SERPL-SCNC: 13 MMOL/L (ref 20–33)
COLOR UR: ABNORMAL
CREAT BLD-MCNC: 0.4 MG/DL (ref 0.5–1.4)
CREAT SERPL-MCNC: 0.66 MG/DL (ref 0.5–1.4)
CULTURE IF INDICATED INDCX: YES UA CULTURE
EKG IMPRESSION: NORMAL
EOSINOPHIL # BLD AUTO: 0 K/UL (ref 0–0.51)
EOSINOPHIL NFR BLD: 0 % (ref 0–6.9)
EPI CELLS #/AREA URNS HPF: ABNORMAL /HPF
ERYTHROCYTE [DISTWIDTH] IN BLOOD BY AUTOMATED COUNT: 53.1 FL (ref 35.9–50)
GLOBULIN SER CALC-MCNC: 4 G/DL (ref 1.9–3.5)
GLUCOSE BLD-MCNC: 136 MG/DL (ref 65–99)
GLUCOSE SERPL-MCNC: 130 MG/DL (ref 65–99)
GLUCOSE UR STRIP.AUTO-MCNC: >=1000 MG/DL
HCG UR QL: POSITIVE
HCT VFR BLD AUTO: 52.3 % (ref 37–47)
HGB BLD-MCNC: 17.6 G/DL (ref 12–16)
HYALINE CASTS #/AREA URNS LPF: ABNORMAL /LPF
IMM GRANULOCYTES # BLD AUTO: 0.09 K/UL (ref 0–0.11)
IMM GRANULOCYTES NFR BLD AUTO: 0.7 % (ref 0–0.9)
KETONES UR STRIP.AUTO-MCNC: >=160 MG/DL
LEUKOCYTE ESTERASE UR QL STRIP.AUTO: ABNORMAL
LIPASE SERPL-CCNC: 35 U/L (ref 7–58)
LYMPHOCYTES # BLD AUTO: 1.06 K/UL (ref 1–4.8)
LYMPHOCYTES NFR BLD: 8.3 % (ref 22–41)
MAGNESIUM SERPL-MCNC: 2 MG/DL (ref 1.5–2.5)
MCH RBC QN AUTO: 26.5 PG (ref 27–33)
MCHC RBC AUTO-ENTMCNC: 33.3 G/DL (ref 33.6–35)
MCV RBC AUTO: 79.5 FL (ref 81.4–97.8)
MICRO URNS: ABNORMAL
MONOCYTES # BLD AUTO: 0.95 K/UL (ref 0–0.85)
MONOCYTES NFR BLD AUTO: 7.4 % (ref 0–13.4)
NEUTROPHILS # BLD AUTO: 10.68 K/UL (ref 2–7.15)
NEUTROPHILS NFR BLD: 83.3 % (ref 44–72)
NITRITE UR QL STRIP.AUTO: NEGATIVE
NRBC # BLD AUTO: 0 K/UL
NRBC BLD-RTO: 0 /100 WBC
PH UR STRIP.AUTO: 6 [PH]
PHOSPHATE SERPL-MCNC: 4.1 MG/DL (ref 2.5–4.5)
PLATELET # BLD AUTO: 395 K/UL (ref 164–446)
PMV BLD AUTO: 11.5 FL (ref 9–12.9)
POTASSIUM BLD-SCNC: 3.6 MMOL/L (ref 3.6–5.5)
POTASSIUM SERPL-SCNC: 4.3 MMOL/L (ref 3.6–5.5)
PROT SERPL-MCNC: 8 G/DL (ref 6–8.2)
PROT UR QL STRIP: 100 MG/DL
RBC # BLD AUTO: 6.6 M/UL (ref 4.2–5.4)
RBC # URNS HPF: ABNORMAL /HPF
RBC UR QL AUTO: NEGATIVE
SODIUM BLD-SCNC: 139 MMOL/L (ref 135–145)
SODIUM SERPL-SCNC: 139 MMOL/L (ref 135–145)
SP GR UR REFRACTOMETRY: 1.03
SP GR UR STRIP.AUTO: 1.03
UROBILINOGEN UR STRIP.AUTO-MCNC: 1 MG/DL
WBC # BLD AUTO: 12.8 K/UL (ref 4.8–10.8)
WBC #/AREA URNS HPF: ABNORMAL /HPF

## 2018-02-12 PROCEDURE — 96374 THER/PROPH/DIAG INJ IV PUSH: CPT

## 2018-02-12 PROCEDURE — 84100 ASSAY OF PHOSPHORUS: CPT

## 2018-02-12 PROCEDURE — 83690 ASSAY OF LIPASE: CPT

## 2018-02-12 PROCEDURE — 82947 ASSAY GLUCOSE BLOOD QUANT: CPT

## 2018-02-12 PROCEDURE — 81025 URINE PREGNANCY TEST: CPT

## 2018-02-12 PROCEDURE — 82374 ASSAY BLOOD CARBON DIOXIDE: CPT

## 2018-02-12 PROCEDURE — 99285 EMERGENCY DEPT VISIT HI MDM: CPT

## 2018-02-12 PROCEDURE — 96361 HYDRATE IV INFUSION ADD-ON: CPT

## 2018-02-12 PROCEDURE — 84132 ASSAY OF SERUM POTASSIUM: CPT

## 2018-02-12 PROCEDURE — 81001 URINALYSIS AUTO W/SCOPE: CPT

## 2018-02-12 PROCEDURE — 96376 TX/PRO/DX INJ SAME DRUG ADON: CPT

## 2018-02-12 PROCEDURE — 700105 HCHG RX REV CODE 258: Performed by: EMERGENCY MEDICINE

## 2018-02-12 PROCEDURE — 700105 HCHG RX REV CODE 258: Performed by: HOSPITALIST

## 2018-02-12 PROCEDURE — 84520 ASSAY OF UREA NITROGEN: CPT

## 2018-02-12 PROCEDURE — 99223 1ST HOSP IP/OBS HIGH 75: CPT | Performed by: HOSPITALIST

## 2018-02-12 PROCEDURE — 770006 HCHG ROOM/CARE - MED/SURG/GYN SEMI*

## 2018-02-12 PROCEDURE — 84295 ASSAY OF SERUM SODIUM: CPT

## 2018-02-12 PROCEDURE — 87086 URINE CULTURE/COLONY COUNT: CPT

## 2018-02-12 PROCEDURE — 82565 ASSAY OF CREATININE: CPT

## 2018-02-12 PROCEDURE — 93005 ELECTROCARDIOGRAM TRACING: CPT

## 2018-02-12 PROCEDURE — 83735 ASSAY OF MAGNESIUM: CPT

## 2018-02-12 PROCEDURE — 80053 COMPREHEN METABOLIC PANEL: CPT

## 2018-02-12 PROCEDURE — 93005 ELECTROCARDIOGRAM TRACING: CPT | Performed by: EMERGENCY MEDICINE

## 2018-02-12 PROCEDURE — 700102 HCHG RX REV CODE 250 W/ 637 OVERRIDE(OP): Performed by: HOSPITALIST

## 2018-02-12 PROCEDURE — 700111 HCHG RX REV CODE 636 W/ 250 OVERRIDE (IP): Performed by: EMERGENCY MEDICINE

## 2018-02-12 PROCEDURE — A9270 NON-COVERED ITEM OR SERVICE: HCPCS | Performed by: HOSPITALIST

## 2018-02-12 PROCEDURE — 82435 ASSAY OF BLOOD CHLORIDE: CPT

## 2018-02-12 PROCEDURE — 85025 COMPLETE CBC W/AUTO DIFF WBC: CPT

## 2018-02-12 RX ORDER — PYRIDOXINE HCL (VITAMIN B6) 50 MG
25 TABLET ORAL 3 TIMES DAILY
Status: DISCONTINUED | OUTPATIENT
Start: 2018-02-12 | End: 2018-02-18 | Stop reason: HOSPADM

## 2018-02-12 RX ORDER — PROMETHAZINE HYDROCHLORIDE 25 MG/1
25 SUPPOSITORY RECTAL
COMMUNITY

## 2018-02-12 RX ORDER — ONDANSETRON 2 MG/ML
4 INJECTION INTRAMUSCULAR; INTRAVENOUS EVERY 4 HOURS PRN
Status: DISCONTINUED | OUTPATIENT
Start: 2018-02-12 | End: 2018-02-18 | Stop reason: HOSPADM

## 2018-02-12 RX ORDER — SODIUM CHLORIDE 9 MG/ML
1000 INJECTION, SOLUTION INTRAVENOUS ONCE
Status: COMPLETED | OUTPATIENT
Start: 2018-02-12 | End: 2018-02-12

## 2018-02-12 RX ORDER — PROMETHAZINE HYDROCHLORIDE 12.5 MG/1
12.5-25 SUPPOSITORY RECTAL EVERY 4 HOURS PRN
Status: DISCONTINUED | OUTPATIENT
Start: 2018-02-12 | End: 2018-02-15

## 2018-02-12 RX ORDER — ONDANSETRON 2 MG/ML
4 INJECTION INTRAMUSCULAR; INTRAVENOUS
Status: DISCONTINUED | OUTPATIENT
Start: 2018-02-12 | End: 2018-02-12

## 2018-02-12 RX ORDER — METOCLOPRAMIDE 5 MG/1
5 TABLET ORAL
Status: DISCONTINUED | OUTPATIENT
Start: 2018-02-12 | End: 2018-02-15

## 2018-02-12 RX ORDER — DEXTROSE AND SODIUM CHLORIDE 5; .9 G/100ML; G/100ML
1000 INJECTION, SOLUTION INTRAVENOUS ONCE
Status: COMPLETED | OUTPATIENT
Start: 2018-02-12 | End: 2018-02-12

## 2018-02-12 RX ORDER — PROMETHAZINE HYDROCHLORIDE 25 MG/1
25 SUPPOSITORY RECTAL
Status: DISCONTINUED | OUTPATIENT
Start: 2018-02-12 | End: 2018-02-12

## 2018-02-12 RX ORDER — POLYETHYLENE GLYCOL 3350 17 G/17G
1 POWDER, FOR SOLUTION ORAL
Status: DISCONTINUED | OUTPATIENT
Start: 2018-02-12 | End: 2018-02-18 | Stop reason: HOSPADM

## 2018-02-12 RX ORDER — PROMETHAZINE HYDROCHLORIDE 25 MG/1
25 TABLET ORAL EVERY 6 HOURS PRN
Status: DISCONTINUED | OUTPATIENT
Start: 2018-02-12 | End: 2018-02-12

## 2018-02-12 RX ORDER — BISACODYL 10 MG
10 SUPPOSITORY, RECTAL RECTAL
Status: DISCONTINUED | OUTPATIENT
Start: 2018-02-12 | End: 2018-02-15

## 2018-02-12 RX ORDER — ONDANSETRON 4 MG/1
4 TABLET, ORALLY DISINTEGRATING ORAL EVERY 4 HOURS PRN
Status: DISCONTINUED | OUTPATIENT
Start: 2018-02-12 | End: 2018-02-18 | Stop reason: HOSPADM

## 2018-02-12 RX ORDER — PROMETHAZINE HYDROCHLORIDE 25 MG/1
12.5-25 TABLET ORAL EVERY 4 HOURS PRN
Status: DISCONTINUED | OUTPATIENT
Start: 2018-02-12 | End: 2018-02-15

## 2018-02-12 RX ORDER — ONDANSETRON 2 MG/ML
4 INJECTION INTRAMUSCULAR; INTRAVENOUS ONCE
Status: COMPLETED | OUTPATIENT
Start: 2018-02-12 | End: 2018-02-12

## 2018-02-12 RX ORDER — AMOXICILLIN 250 MG
2 CAPSULE ORAL 2 TIMES DAILY
Status: DISCONTINUED | OUTPATIENT
Start: 2018-02-12 | End: 2018-02-18 | Stop reason: HOSPADM

## 2018-02-12 RX ORDER — SODIUM CHLORIDE 9 MG/ML
INJECTION, SOLUTION INTRAVENOUS CONTINUOUS
Status: DISCONTINUED | OUTPATIENT
Start: 2018-02-12 | End: 2018-02-14

## 2018-02-12 RX ORDER — SODIUM CHLORIDE 9 MG/ML
1000 INJECTION, SOLUTION INTRAVENOUS ONCE
Status: CANCELLED | OUTPATIENT
Start: 2018-02-12 | End: 2018-02-12

## 2018-02-12 RX ADMIN — SODIUM CHLORIDE: 9 INJECTION, SOLUTION INTRAVENOUS at 22:27

## 2018-02-12 RX ADMIN — ONDANSETRON 4 MG: 2 INJECTION INTRAMUSCULAR; INTRAVENOUS at 17:22

## 2018-02-12 RX ADMIN — ONDANSETRON 4 MG: 2 INJECTION INTRAMUSCULAR; INTRAVENOUS at 16:27

## 2018-02-12 RX ADMIN — SODIUM CHLORIDE 1000 ML: 9 INJECTION, SOLUTION INTRAVENOUS at 17:00

## 2018-02-12 RX ADMIN — DEXTROSE AND SODIUM CHLORIDE 1000 ML: 5; 900 INJECTION, SOLUTION INTRAVENOUS at 16:27

## 2018-02-12 ASSESSMENT — ENCOUNTER SYMPTOMS
PND: 0
NAUSEA: 1
HEARTBURN: 0
BLURRED VISION: 0
DEPRESSION: 0
MEMORY LOSS: 0
HEMOPTYSIS: 0
EYE PAIN: 0
PALPITATIONS: 0
VOMITING: 1
TINGLING: 0
NECK PAIN: 0
BLOOD IN STOOL: 0
NERVOUS/ANXIOUS: 0
WEAKNESS: 0
TREMORS: 0
PHOTOPHOBIA: 0
SHORTNESS OF BREATH: 0
SPEECH CHANGE: 0
DOUBLE VISION: 0
FEVER: 0
SORE THROAT: 0
SPUTUM PRODUCTION: 0
STRIDOR: 0
MYALGIAS: 0
COUGH: 0
ORTHOPNEA: 0
CHILLS: 0
DIZZINESS: 0
CLAUDICATION: 0
CONSTIPATION: 0
HEADACHES: 0
BACK PAIN: 0
SENSORY CHANGE: 0

## 2018-02-12 ASSESSMENT — PAIN SCALES - GENERAL
PAINLEVEL_OUTOF10: 5
PAINLEVEL_OUTOF10: 0

## 2018-02-12 NOTE — ED TRIAGE NOTES
Ambulatory to triage with   Chief Complaint   Patient presents with   • Pregnancy   • N/V     Hyperemesis; recent 8 day hospital admit for the same   . EKG complete. States admitted 2 weeks ago for tachycardia and hyperemesis. Complains of same symptoms. Denies fevers. Denies lower abd pain, vagina bleeding, or abnormal discharge.

## 2018-02-12 NOTE — PROGRESS NOTES
"CM received a message from patient's Mother on Sunday evening requesting a call back. Message stated patient was still sick and throwing up when eating a lot.  CM returned call this am and requested to speak to patient.  CM spoke to patient about current symptoms. Patient reports she has been feeling sick.  States she has been nauseated and has been vomiting intermittently.  Patient gave CM permission to discuss any health related information with her Mother.  Patient put CM on speaker phone.  Patient states she has been vomiting with drinking water and also with drinking orange juice.  Patient's Mother states patient is vomiting \"when eating too much\". Patient states she is taking her medications as directed.  Patient is not scheduled to see a provider at discharge clinic until Wednesday 2/14/18.  Mother states patient is visiting from [place] and isn't sure when patient will be able to travel back to [place].  CM recommended patient return to ER for evaluation.  Mother and patient voiced understanding.   "

## 2018-02-13 LAB
ALBUMIN SERPL BCP-MCNC: 3.5 G/DL (ref 3.2–4.9)
ALBUMIN/GLOB SERPL: 1.2 G/DL
ALP SERPL-CCNC: 69 U/L (ref 30–99)
ALT SERPL-CCNC: 37 U/L (ref 2–50)
ANION GAP SERPL CALC-SCNC: 12 MMOL/L (ref 0–11.9)
AST SERPL-CCNC: 30 U/L (ref 12–45)
BASOPHILS # BLD AUTO: 0.1 % (ref 0–1.8)
BASOPHILS # BLD: 0.02 K/UL (ref 0–0.12)
BILIRUB SERPL-MCNC: 0.4 MG/DL (ref 0.1–1.5)
BUN SERPL-MCNC: 5 MG/DL (ref 8–22)
CALCIUM SERPL-MCNC: 9.8 MG/DL (ref 8.5–10.5)
CHLORIDE SERPL-SCNC: 111 MMOL/L (ref 96–112)
CO2 SERPL-SCNC: 22 MMOL/L (ref 20–33)
CREAT SERPL-MCNC: 0.46 MG/DL (ref 0.5–1.4)
EOSINOPHIL # BLD AUTO: 0 K/UL (ref 0–0.51)
EOSINOPHIL NFR BLD: 0 % (ref 0–6.9)
ERYTHROCYTE [DISTWIDTH] IN BLOOD BY AUTOMATED COUNT: 54 FL (ref 35.9–50)
GLOBULIN SER CALC-MCNC: 2.9 G/DL (ref 1.9–3.5)
GLUCOSE SERPL-MCNC: 104 MG/DL (ref 65–99)
HCT VFR BLD AUTO: 44.4 % (ref 37–47)
HGB BLD-MCNC: 14.3 G/DL (ref 12–16)
IMM GRANULOCYTES # BLD AUTO: 0.1 K/UL (ref 0–0.11)
IMM GRANULOCYTES NFR BLD AUTO: 0.6 % (ref 0–0.9)
LYMPHOCYTES # BLD AUTO: 0.85 K/UL (ref 1–4.8)
LYMPHOCYTES NFR BLD: 5.2 % (ref 22–41)
MCH RBC QN AUTO: 26.1 PG (ref 27–33)
MCHC RBC AUTO-ENTMCNC: 32.2 G/DL (ref 33.6–35)
MCV RBC AUTO: 81 FL (ref 81.4–97.8)
MONOCYTES # BLD AUTO: 1.06 K/UL (ref 0–0.85)
MONOCYTES NFR BLD AUTO: 6.5 % (ref 0–13.4)
NEUTROPHILS # BLD AUTO: 14.25 K/UL (ref 2–7.15)
NEUTROPHILS NFR BLD: 87.6 % (ref 44–72)
NRBC # BLD AUTO: 0 K/UL
NRBC BLD-RTO: 0 /100 WBC
PLATELET # BLD AUTO: 280 K/UL (ref 164–446)
PMV BLD AUTO: 11.3 FL (ref 9–12.9)
POTASSIUM SERPL-SCNC: 3.1 MMOL/L (ref 3.6–5.5)
PROT SERPL-MCNC: 6.4 G/DL (ref 6–8.2)
RBC # BLD AUTO: 5.48 M/UL (ref 4.2–5.4)
SODIUM SERPL-SCNC: 145 MMOL/L (ref 135–145)
WBC # BLD AUTO: 16.3 K/UL (ref 4.8–10.8)

## 2018-02-13 PROCEDURE — 700102 HCHG RX REV CODE 250 W/ 637 OVERRIDE(OP): Performed by: INTERNAL MEDICINE

## 2018-02-13 PROCEDURE — 99232 SBSQ HOSP IP/OBS MODERATE 35: CPT | Performed by: INTERNAL MEDICINE

## 2018-02-13 PROCEDURE — 80053 COMPREHEN METABOLIC PANEL: CPT

## 2018-02-13 PROCEDURE — A9270 NON-COVERED ITEM OR SERVICE: HCPCS | Performed by: INTERNAL MEDICINE

## 2018-02-13 PROCEDURE — 85025 COMPLETE CBC W/AUTO DIFF WBC: CPT

## 2018-02-13 PROCEDURE — 770006 HCHG ROOM/CARE - MED/SURG/GYN SEMI*

## 2018-02-13 PROCEDURE — 700111 HCHG RX REV CODE 636 W/ 250 OVERRIDE (IP): Performed by: HOSPITALIST

## 2018-02-13 PROCEDURE — A9270 NON-COVERED ITEM OR SERVICE: HCPCS | Performed by: HOSPITALIST

## 2018-02-13 PROCEDURE — 700105 HCHG RX REV CODE 258: Performed by: HOSPITALIST

## 2018-02-13 PROCEDURE — 700102 HCHG RX REV CODE 250 W/ 637 OVERRIDE(OP): Performed by: HOSPITALIST

## 2018-02-13 PROCEDURE — 36415 COLL VENOUS BLD VENIPUNCTURE: CPT

## 2018-02-13 RX ORDER — NITROFURANTOIN 25; 75 MG/1; MG/1
100 CAPSULE ORAL 2 TIMES DAILY WITH MEALS
Status: DISCONTINUED | OUTPATIENT
Start: 2018-02-13 | End: 2018-02-18 | Stop reason: HOSPADM

## 2018-02-13 RX ADMIN — NITROFURANTOIN (MONOHYDRATE/MACROCRYSTALS) 100 MG: 75; 25 CAPSULE ORAL at 09:37

## 2018-02-13 RX ADMIN — METOCLOPRAMIDE HYDROCHLORIDE 5 MG: 5 TABLET ORAL at 00:17

## 2018-02-13 RX ADMIN — SODIUM CHLORIDE: 9 INJECTION, SOLUTION INTRAVENOUS at 21:30

## 2018-02-13 RX ADMIN — Medication 25 MG: at 15:00

## 2018-02-13 RX ADMIN — METOCLOPRAMIDE HYDROCHLORIDE 5 MG: 5 TABLET ORAL at 21:41

## 2018-02-13 RX ADMIN — NITROFURANTOIN (MONOHYDRATE/MACROCRYSTALS) 100 MG: 75; 25 CAPSULE ORAL at 17:39

## 2018-02-13 RX ADMIN — Medication 25 MG: at 00:17

## 2018-02-13 RX ADMIN — Medication 25 MG: at 21:30

## 2018-02-13 RX ADMIN — DOXYLAMINE SUCCINATE 25 MG: 25 TABLET ORAL at 21:30

## 2018-02-13 RX ADMIN — PROMETHAZINE HYDROCHLORIDE 12.5 MG: 12.5 SUPPOSITORY RECTAL at 02:27

## 2018-02-13 RX ADMIN — METOCLOPRAMIDE HYDROCHLORIDE 5 MG: 5 TABLET ORAL at 09:36

## 2018-02-13 RX ADMIN — METOCLOPRAMIDE HYDROCHLORIDE 5 MG: 5 TABLET ORAL at 06:52

## 2018-02-13 RX ADMIN — STANDARDIZED SENNA CONCENTRATE AND DOCUSATE SODIUM 2 TABLET: 8.6; 5 TABLET, FILM COATED ORAL at 09:37

## 2018-02-13 RX ADMIN — SODIUM CHLORIDE: 9 INJECTION, SOLUTION INTRAVENOUS at 05:24

## 2018-02-13 RX ADMIN — ONDANSETRON 4 MG: 2 INJECTION INTRAMUSCULAR; INTRAVENOUS at 00:56

## 2018-02-13 RX ADMIN — Medication 25 MG: at 09:37

## 2018-02-13 RX ADMIN — METOCLOPRAMIDE HYDROCHLORIDE 5 MG: 5 TABLET ORAL at 17:39

## 2018-02-13 RX ADMIN — DOXYLAMINE SUCCINATE 25 MG: 25 TABLET ORAL at 00:16

## 2018-02-13 ASSESSMENT — ENCOUNTER SYMPTOMS
NAUSEA: 1
VOMITING: 0
BLURRED VISION: 0
COUGH: 0
DOUBLE VISION: 0
DIZZINESS: 0
FOCAL WEAKNESS: 0
DIARRHEA: 0
CONSTIPATION: 0
ABDOMINAL PAIN: 0
HEADACHES: 0
CHILLS: 0
SHORTNESS OF BREATH: 0
FEVER: 0

## 2018-02-13 ASSESSMENT — PATIENT HEALTH QUESTIONNAIRE - PHQ9
2. FEELING DOWN, DEPRESSED, IRRITABLE, OR HOPELESS: NOT AT ALL
SUM OF ALL RESPONSES TO PHQ9 QUESTIONS 1 AND 2: 0
SUM OF ALL RESPONSES TO PHQ QUESTIONS 1-9: 0
1. LITTLE INTEREST OR PLEASURE IN DOING THINGS: NOT AT ALL

## 2018-02-13 ASSESSMENT — PAIN SCALES - GENERAL
PAINLEVEL_OUTOF10: 0
PAINLEVEL_OUTOF10: 0

## 2018-02-13 ASSESSMENT — LIFESTYLE VARIABLES
ALCOHOL_USE: NO
EVER_SMOKED: NEVER
DO YOU DRINK ALCOHOL: NO

## 2018-02-13 NOTE — PROGRESS NOTES
Patient arrived on this unit via gurney. Patient is alert and oriented x 4. Nauseous at this time. Able to make needs known. No family at bedside.     Full assessment pending at this time, patient gets nauseated and vomits when moving around. 1 episode of vomiting at this time. Refusing oral medications, patient wants to rest for now. Denies pain at this time. On room air, tolerating well. Not in respiratory distress.     Fall precautions in place. Patient educated regarding plan of care. Instructed to call for assistance. Verbalized understanding. Call light and personal belongings within reach.    Urine specimen collected as ordered.

## 2018-02-13 NOTE — ED NOTES
Carmudi from Lab called with critical result of hgb at 17.6. Critical lab result read back to Carmudi.

## 2018-02-13 NOTE — ASSESSMENT & PLAN NOTE
Positive UA and symptomatic.  Urine culture growing gardnerella vaginalis  Complete course of macrobid

## 2018-02-13 NOTE — PROGRESS NOTES
2 RN skin check done with LORETTA Alberto.    Patient skin is intact. No open areas noted.     Bruising on bilateral upper extremities noted.

## 2018-02-13 NOTE — H&P
Hospital Medicine History and Physical    Date of Service  2018    Chief Complaint  Chief Complaint   Patient presents with   • Pregnancy   • N/V     Hyperemesis; recent 8 day hospital admit for the same       History of Presenting Illness  22 y.o. female with a past medical history of 14-week pregnant female A0 presented 2018 to the ER for evaluation of intractable nausea vomiting and upper abdominal discomfort that started 2 days ago. Patient says that there is minimal streaks of blood in her vomit. Otherwise denies having any fevers or chills, denies dysuria. Patient was recently discharged on the  of this month for hyperemesis gravidarum , was rehydrated provided IV antiemetics and and was discharged in stable condition. At this point patient is having similar issues like she did 2 weeks ago. At this point we will admit patient for Hyperemesis gravidarum, provide her with IV fluids, IV anti-emetics.      Primary Care Physician  Pcp Pt States None    Consultants  [facility]    Code Status  Code: Full code    Review of Systems  Review of Systems   Constitutional: Negative for chills, fever and malaise/fatigue.   HENT: Negative for congestion, hearing loss, sore throat and tinnitus.    Eyes: Negative for blurred vision, double vision, photophobia and pain.   Respiratory: Negative for cough, hemoptysis, sputum production, shortness of breath and stridor.    Cardiovascular: Negative for chest pain, palpitations, orthopnea, claudication and PND.   Gastrointestinal: Positive for nausea and vomiting. Negative for blood in stool, constipation, heartburn and melena.   Genitourinary: Negative for dysuria, frequency and urgency.   Musculoskeletal: Negative for back pain, myalgias and neck pain.   Neurological: Negative for dizziness, tingling, tremors, sensory change, speech change, weakness and headaches.   Psychiatric/Behavioral: Negative for depression, memory loss and suicidal ideas. The patient is not  nervous/anxious.           Past Medical History  No past medical history on file.    Surgical History  No past surgical history on file.    Medications  No current facility-administered medications on file prior to encounter.      Current Outpatient Prescriptions on File Prior to Encounter   Medication Sig Dispense Refill   • promethazine (PHENERGAN) 25 MG Tab Take 1 Tab by mouth every 6 hours as needed for Nausea/Vomiting. 30 Tab 0   • potassium chloride SA (KDUR) 20 MEQ Tab CR Take 1 Tab by mouth every day for 5 days. 5 Tab 0   • metoclopramide (REGLAN) 5 MG tablet Take 1 Tab by mouth 4 times a day as needed (nausea). 20 Tab 0   • ondansetron (ZOFRAN ODT) 4 MG TABLET DISPERSIBLE Take 4 mg by mouth every 6 hours as needed for Nausea.     • pyridoxine (VITAMIN B-6) 50 MG Tab Take 50 mg by mouth every day.         Family History  No family history on file.   Denies family history of diabetes    Social History  Social History   Substance Use Topics   • Smoking status: Never Smoker   • Smokeless tobacco: Never Used   • Alcohol use No       Allergies  No Known Allergies     Physical Exam  Laboratory   Hemodynamics  Temp (24hrs), Av.3 °C (97.3 °F), Min:36.1 °C (97 °F), Max:36.4 °C (97.5 °F)   Temperature: 36.1 °C (97 °F)  Pulse  Av.7  Min: 97  Max: 165 Heart Rate (Monitored): (!) 113  Blood Pressure: 115/67, NIBP: (!) 96/56      Respiratory      Respiration: 16, Pulse Oximetry: 98 %             Physical Exam   Constitutional: She is oriented to person, place, and time. She appears well-developed and well-nourished. No distress.   HENT:   Head: Normocephalic and atraumatic.   Mouth/Throat: No oropharyngeal exudate.   Eyes: Conjunctivae are normal. Pupils are equal, round, and reactive to light. Right eye exhibits no discharge. No scleral icterus.   Neck: Neck supple. No JVD present. No thyromegaly present.   Cardiovascular: Normal rate and intact distal pulses.    No murmur heard.  Pulmonary/Chest: Effort normal  and breath sounds normal. No stridor. No respiratory distress. She has no wheezes. She has no rales.   Abdominal: Soft. Bowel sounds are normal. She exhibits no distension. There is no tenderness. There is no rebound.   Musculoskeletal: Normal range of motion. She exhibits no edema.   Neurological: She is alert and oriented to person, place, and time. No cranial nerve deficit.   Skin: Skin is warm. She is not diaphoretic. No erythema.   Psychiatric: She has a normal mood and affect. Her behavior is normal. Thought content normal.         Assessment/Plan  Hyperemesis gravidarum- (present on admission)   Assessment & Plan    Recurrent   Her obgyn outpatient  Dr. Tilley [phone], consider consultation if symptoms don't improve in the am.  Continue IV antiemetics, reglan, phenergan suppositories, zofran is needed if rest dont improve.  Continue IV fluid hydration, after hydration consider changing to D5W.  Trial liquids in the am if nausea resolved.         Metabolic acidosis- (present on admission)   Assessment & Plan    As a result of nausea/vommiting.  Continue with IV fluids, recheck bmp in few hours        Microcytic anemia   Assessment & Plan    Borderline microcytic anemia  Check iron panel  Recommend continuation of prenatal vitamins were able to tolerate by mouth        Pregnant- (present on admission)   Assessment & Plan    A0 14 weeks pregnant.             I anticipate this patient will require at least two midnights for appropriate medical management, necessitating inpatient admission.    Prophylaxis: SCDs    Recent Labs      18   1528   WBC  12.8*   RBC  6.60*   HEMOGLOBIN  17.6*   HEMATOCRIT  52.3*   MCV  79.5*   MCH  26.5*   MCHC  33.3*   RDW  53.1*   PLATELETCT  395   MPV  11.5     Recent Labs      18   1528   SODIUM  139   POTASSIUM  4.3   CHLORIDE  106   CO2  13*   GLUCOSE  130*   BUN  8   CREATININE  0.66   CALCIUM  10.9*     Recent Labs      18   1528   ALTSGPT  55*    ASTSGOT  65*   ALKPHOSPHAT  76   TBILIRUBIN  1.3   LIPASE  35   GLUCOSE  130*                 No results found for: TROPONINI    Imaging  No orders to display

## 2018-02-13 NOTE — ED NOTES
Med rec complete per pt's family bedside.   Per family she was alternating Promethazine,  Reglan, and Zofran every 3 hours for nausea.  Family cannot remember when last given Reglan and Zofran, but it was last night.   Per family Pt recently here.  Allergies reviewed.

## 2018-02-13 NOTE — ASSESSMENT & PLAN NOTE
Recurrent   Diet as tolerated  unisom qhs, compazine TID  zofran and phenergan PRN  Her obgyn outpatient  Dr. Tilley [phone], spoke with him 2/15

## 2018-02-13 NOTE — ED NOTES
Pt was admitted on 1/29/18 for same. Upon arrival to room pt vomiting and tachycardic, pt placed on cardiac monitor, IV established, awaiting ERP.

## 2018-02-13 NOTE — PROGRESS NOTES
Scheduled medications given at 0017. Patient had 1 episode of vomiting before administration, but patient wants to try to take the medications.    Med Rec and Admit profile completed at this time. Patient still feels nauseous but able to tolerate oral medications and fluids at this time. Will continue to monitor for nausea and vomiting.

## 2018-02-13 NOTE — PROGRESS NOTES
Patient arrived on this unit with transport via gurney. CNA situated patient in room. Call light within reach.

## 2018-02-13 NOTE — ED PROVIDER NOTES
ED Provider Note    Scribed for Laci Wan D.O. by Anna Tamayo. 2018  4:29 PM    Primary care provider: Pcp Pt States None  Means of arrival: Walk-in  History obtained from: Patient, mother  History limited by: None    CHIEF COMPLAINT  Chief Complaint   Patient presents with   • Pregnancy   • N/V     Hyperemesis; recent 8 day hospital admit for the same       HPI  Nery Spicer is a 22 y.o. 14-week pregnant female (A0) who presents to the Emergency Department for evaluation of persistent vomiting with associated upper abdominal pain, onset 2 days ago. Patient confirms that there is blood in her vomit. No fevers, dysuria, or blood in her stool. Per mother, patient is more pale than usual.        REVIEW OF SYSTEMS  Pertinent positives include hematemesis, abdominal pain, pallor. Pertinent negatives include no fevers, dysuria, blood in stool.  All other systems reviewed and negative. C.     PAST MEDICAL HISTORY  Admitted here with similar presentation 8 days ago.    SURGICAL HISTORY  No pertinent surgical history noted.    SOCIAL HISTORY  Social History   Substance Use Topics   • Smoking status: Never Smoker   • Smokeless tobacco: Never Used   • Alcohol use No      History   Drug Use No       FAMILY HISTORY  No pertinent family history noted.    CURRENT MEDICATIONS    Current Facility-Administered Medications:   •  ondansetron (ZOFRAN) syringe/vial injection 4 mg, 4 mg, Intravenous, Q HOUR PRN, Elliott Mullen M.D., 4 mg at 18 1627  •  NS infusion 1,000 mL, 1,000 mL, Intravenous, Once, Laci Wan D.O.    Current Outpatient Prescriptions:   •  promethazine (PHENERGAN) 25 MG Suppos, Insert 25 mg in rectum at bedtime as needed for Nausea/Vomiting., Disp: , Rfl:   •  promethazine (PHENERGAN) 25 MG Tab, Take 1 Tab by mouth every 6 hours as needed for Nausea/Vomiting., Disp: 30 Tab, Rfl: 0  •  potassium chloride SA (KDUR) 20 MEQ Tab CR, Take 1 Tab by mouth every day for 5 days.,  "Disp: 5 Tab, Rfl: 0  •  metoclopramide (REGLAN) 5 MG tablet, Take 1 Tab by mouth 4 times a day as needed (nausea)., Disp: 20 Tab, Rfl: 0  •  ondansetron (ZOFRAN ODT) 4 MG TABLET DISPERSIBLE, Take 4 mg by mouth every 6 hours as needed for Nausea., Disp: , Rfl:   •  pyridoxine (VITAMIN B-6) 50 MG Tab, Take 50 mg by mouth every day., Disp: , Rfl:     ALLERGIES  No Known Allergies    PHYSICAL EXAM  VITAL SIGNS: /84   Pulse (!) 141   Temp 36.4 °C (97.5 °F) (Temporal)   Resp 20   Ht 1.6 m (5' 3\")   Wt 68.1 kg (150 lb 2.1 oz)   LMP 10/26/2017   SpO2 98%   BMI 26.59 kg/m²     Nursing notes and vitals reviewed.  Constitutional: Well developed, Well nourished, mildly shaking, Non-toxic appearance.   HENT: Dry mucous membranes  Eyes: PERRLA, EOMI, Conjunctiva normal, No discharge.   Cardiovascular: Tachycardic, Normal rhythm, No murmurs, No rubs, No gallops.   Thorax & Lungs: No respiratory distress, No rales, No rhonchi, No wheezing, No chest tenderness.   Abdomen: Bowel sounds normal, Soft, Epigastric tenderness, No guarding, No rebound, No masses, No pulsatile masses.   Skin: Warm, Dry, No erythema, No rash.   Musculoskeletal: Intact distal pulses, No edema, No cyanosis, No clubbing. Good range of motion in all major joints. No tenderness to palpation or major deformities noted, no CVA tenderness, no midline back tenderness.   Neurologic: Alert & oriented x 3, Normal motor function, Normal sensory function, No focal deficits noted.  Psychiatric: Affect normal for clinical presentation.    DIAGNOSTIC STUDIES/PROCEDURES    LABS  Results for orders placed or performed during the hospital encounter of 02/12/18   CBC WITH DIFFERENTIAL   Result Value Ref Range    WBC 12.8 (H) 4.8 - 10.8 K/uL    RBC 6.60 (H) 4.20 - 5.40 M/uL    Hemoglobin 17.6 (H) 12.0 - 16.0 g/dL    Hematocrit 52.3 (H) 37.0 - 47.0 %    MCV 79.5 (L) 81.4 - 97.8 fL    MCH 26.5 (L) 27.0 - 33.0 pg    MCHC 33.3 (L) 33.6 - 35.0 g/dL    RDW 53.1 (H) 35.9 - " 50.0 fL    Platelet Count 395 164 - 446 K/uL    MPV 11.5 9.0 - 12.9 fL    Neutrophils-Polys 83.30 (H) 44.00 - 72.00 %    Lymphocytes 8.30 (L) 22.00 - 41.00 %    Monocytes 7.40 0.00 - 13.40 %    Eosinophils 0.00 0.00 - 6.90 %    Basophils 0.30 0.00 - 1.80 %    Immature Granulocytes 0.70 0.00 - 0.90 %    Nucleated RBC 0.00 /100 WBC    Neutrophils (Absolute) 10.68 (H) 2.00 - 7.15 K/uL    Lymphs (Absolute) 1.06 1.00 - 4.80 K/uL    Monos (Absolute) 0.95 (H) 0.00 - 0.85 K/uL    Eos (Absolute) 0.00 0.00 - 0.51 K/uL    Baso (Absolute) 0.04 0.00 - 0.12 K/uL    Immature Granulocytes (abs) 0.09 0.00 - 0.11 K/uL    NRBC (Absolute) 0.00 K/uL   COMP METABOLIC PANEL   Result Value Ref Range    Sodium 139 135 - 145 mmol/L    Potassium 4.3 3.6 - 5.5 mmol/L    Chloride 106 96 - 112 mmol/L    Co2 13 (L) 20 - 33 mmol/L    Anion Gap 20.0 (H) 0.0 - 11.9    Glucose 130 (H) 65 - 99 mg/dL    Bun 8 8 - 22 mg/dL    Creatinine 0.66 0.50 - 1.40 mg/dL    Calcium 10.9 (H) 8.4 - 10.2 mg/dL    AST(SGOT) 65 (H) 12 - 45 U/L    ALT(SGPT) 55 (H) 2 - 50 U/L    Alkaline Phosphatase 76 30 - 99 U/L    Total Bilirubin 1.3 0.1 - 1.5 mg/dL    Albumin 4.0 3.2 - 4.9 g/dL    Total Protein 8.0 6.0 - 8.2 g/dL    Globulin 4.0 (H) 1.9 - 3.5 g/dL    A-G Ratio 1.0 g/dL   LIPASE   Result Value Ref Range    Lipase 35 7 - 58 U/L   MAGNESIUM   Result Value Ref Range    Magnesium 2.0 1.5 - 2.5 mg/dL   PHOSPHORUS   Result Value Ref Range    Phosphorus 4.1 2.5 - 4.5 mg/dL   ESTIMATED GFR   Result Value Ref Range    GFR If African American >60 >60 mL/min/1.73 m 2    GFR If Non African American >60 >60 mL/min/1.73 m 2   EKG (ER)   Result Value Ref Range    Report       [facility] Emergency Dept.    Test Date:  2018  Pt Name:    SMITH CARRASCO             Department: ER  MRN:        [id number]                      Room:  Gender:     Female                       Technician: [id number]  :        [id number]                   Requested By:ER TRIAGE  PROTOCOL  Order #:    [id number]                    Reading MD: JAJA WALTERS DO    Measurements  Intervals                                Axis  Rate:       131                          P:          71  MA:         140                          QRS:        39  QRSD:       68                           T:          10  QT:         284  QTc:        420    Interpretive Statements  SINUS TACHYCARDIA  CONSIDER RIGHT ATRIAL ABNORMALITY  No previous ECG available for comparison    Electronically Signed On 2- 18:06:49 PST by JAJA WALTERS DO     ISTAT CO2   Result Value Ref Range    Istat CO2 18 (L) 20 - 33 mmol/L   ISTAT GLUCOSE   Result Value Ref Range    Istat Glucose 136 (H) 65 - 99 mg/dL   ISTAT BUN   Result Value Ref Range    Istat Bun 9 8 - 22 mg/dL   ISTAT CREATININE   Result Value Ref Range    Istat Creatinine 0.4 (L) 0.5 - 1.4 mg/dL   ISTAT SODIUM   Result Value Ref Range    Istat Sodium 139 135 - 145 mmol/L   ISTAT POTASSIUM   Result Value Ref Range    Istat Potassium 3.6 3.6 - 5.5 mmol/L   ISTAT CHLORIDE   Result Value Ref Range    Istat Chloride 112 96 - 112 mmol/L   All labs reviewed by me.      RADIOLOGY  No orders to display     The radiologist's interpretation of all radiological studies have been reviewed by me.    COURSE & MEDICAL DECISION MAKING  Pertinent Labs & Imaging studies reviewed. (See chart for details)    4:29 PM - Patient seen and examined at bedside. Patient will be treated with IV Fluids for tachycardia and dry mucous membranes and Zofran 4 mg. Ordered Magnesium, Phosphorus, Urinalysis, Beta-HCG Qual, CBC, CMP, Lipase, Refractometer SG, EKG, and Fetal Heart Tones to evaluate her symptoms.     This is a [facility] 22 y.o. female that presents with hyperemesis gravidarum. The patient is tachycardic at she has normal blood pressure. She has normal fetal heart tones are 1 67 bpm. The patient received 1 L normal saline fluid, Zofran 4 mg IV and continues to have tachycardia. I do  believe the patient requires admission to the hospital secondary to profound acidosis with a CO2 of 13 and I have gap of 20. I discussed the patient with the hospitalist for further evaluation and management and admission to the hospital.     DISPOSITION:  Patient will be admitted to Dr. Vazquez in guarded condition  FINAL IMPRESSION  Hyperemesis gravidarum  Metabolic acidosis   I, Anna Tamayo (Scribe), am scribing for, and in the presence of, Laci Wan D.O    Electronically signed by: Anna Tamayo (Scribe), 2/12/2018    ILaci D.O. personally performed the services described in this documentation, as scribed by Anna Tamayo in my presence, and it is both accurate and complete.    The note accurately reflects work and decisions made by me.  Laci Wan  2/12/2018  6:08 PM

## 2018-02-13 NOTE — PROGRESS NOTES
[facility] Hospitalist Progress Note    Date of Service: 2018    Chief Complaint  22 y.o. female admitted 2018 with 14 week pregnancy with N/V and hematemesis. Found with UTI.    Interval Problem Update  No further vomiting. Has some nausea and drinking some water.  Rise in WBC  Positive Ua, started nitro, urine culture ordered    Consultants/Specialty  None    Disposition  Home pending PO intake        Review of Systems   Constitutional: Negative for chills and fever.   Eyes: Negative for blurred vision and double vision.   Respiratory: Negative for cough and shortness of breath.    Cardiovascular: Negative for chest pain and leg swelling.   Gastrointestinal: Positive for nausea. Negative for abdominal pain, constipation, diarrhea and vomiting.   Genitourinary: Positive for dysuria, frequency and urgency.   Skin: Negative for rash.   Neurological: Negative for dizziness, focal weakness and headaches.      Physical Exam  Laboratory/Imaging   Hemodynamics  Temp (24hrs), Av.3 °C (97.3 °F), Min:36.1 °C (97 °F), Max:36.4 °C (97.6 °F)   Temperature: 36.4 °C (97.6 °F)  Pulse  Av.8  Min: 97  Max: 165 Heart Rate (Monitored): (!) 113  Blood Pressure: 123/73, NIBP: (!) 96/56      Respiratory      Respiration: 16, Pulse Oximetry: 96 %             Fluids    Intake/Output Summary (Last 24 hours) at 18 1449  Last data filed at 18 0945   Gross per 24 hour   Intake              834 ml   Output                0 ml   Net              834 ml       Nutrition  Orders Placed This Encounter   Procedures   • Diet Order     Standing Status:   Standing     Number of Occurrences:   1     Order Specific Question:   Diet:     Answer:   Full Liquid [11]     Physical Exam   Constitutional: She is oriented to person, place, and time. She appears well-developed and well-nourished. She is cooperative.   HENT:   Head: Normocephalic and atraumatic.   Eyes: Conjunctivae and EOM are normal.   Cardiovascular: Normal rate,  regular rhythm and normal heart sounds.    Pulmonary/Chest: Effort normal. She has no wheezes.   Abdominal: Soft. She exhibits no distension. There is no tenderness. There is no rebound and no guarding.   Musculoskeletal: She exhibits no edema.   Neurological: She is alert and oriented to person, place, and time.   Skin: Skin is warm and dry.   Nursing note and vitals reviewed.      Recent Labs      18   1528  18   0613   WBC  12.8*  16.3*   RBC  6.60*  5.48*   HEMOGLOBIN  17.6*  14.3   HEMATOCRIT  52.3*  44.4   MCV  79.5*  81.0*   MCH  26.5*  26.1*   MCHC  33.3*  32.2*   RDW  53.1*  54.0*   PLATELETCT  395  280   MPV  11.5  11.3     Recent Labs      18   1528  18   0613   SODIUM  139  145   POTASSIUM  4.3  3.1*   CHLORIDE  106  111   CO2  13*  22   GLUCOSE  130*  104*   BUN  8  5*   CREATININE  0.66  0.46*   CALCIUM  10.9*  9.8                      Assessment/Plan     * Hyperemesis gravidarum- (present on admission)   Assessment & Plan    Recurrent   Improved, ordered for full liquid diet  unisom qhs, reglan QID  zofran and phenergan PRN  Her obgyn outpatient  Dr. Tilley [phone], consider consultation if symptoms don't improve.        UTI (urinary tract infection)- (present on admission)   Assessment & Plan    Positive UA and symptomatic.  Started macrobid.  F/u urine culture        Metabolic acidosis- (present on admission)   Assessment & Plan    As a result of nausea/vommiting.  Resolved with IVF        Microcytic anemia   Assessment & Plan    Borderline microcytic anemia  Check iron panel  Recommend continuation of prenatal vitamins were able to tolerate by mouth        Pregnant- (present on admission)   Assessment & Plan    A0 14 weeks pregnant.           Quality-Core Measures   Reviewed items::  EKG reviewed, Labs reviewed, Medications reviewed and Radiology images reviewed  Tafoya catheter::  No Tafoya  DVT prophylaxis - mechanical:  SCDs

## 2018-02-13 NOTE — ASSESSMENT & PLAN NOTE
Borderline microcytic anemia  Recommend continuation of prenatal vitamins were able to tolerate by mouth

## 2018-02-14 LAB
ANION GAP SERPL CALC-SCNC: 12 MMOL/L (ref 0–11.9)
BUN SERPL-MCNC: <3 MG/DL (ref 8–22)
CALCIUM SERPL-MCNC: 8.8 MG/DL (ref 8.5–10.5)
CHLORIDE SERPL-SCNC: 112 MMOL/L (ref 96–112)
CO2 SERPL-SCNC: 20 MMOL/L (ref 20–33)
CREAT SERPL-MCNC: 0.41 MG/DL (ref 0.5–1.4)
GLUCOSE SERPL-MCNC: 91 MG/DL (ref 65–99)
POTASSIUM SERPL-SCNC: 2.7 MMOL/L (ref 3.6–5.5)
POTASSIUM SERPL-SCNC: 3.1 MMOL/L (ref 3.6–5.5)
SODIUM SERPL-SCNC: 144 MMOL/L (ref 135–145)

## 2018-02-14 PROCEDURE — 770006 HCHG ROOM/CARE - MED/SURG/GYN SEMI*

## 2018-02-14 PROCEDURE — 80048 BASIC METABOLIC PNL TOTAL CA: CPT

## 2018-02-14 PROCEDURE — 99232 SBSQ HOSP IP/OBS MODERATE 35: CPT | Performed by: INTERNAL MEDICINE

## 2018-02-14 PROCEDURE — 700101 HCHG RX REV CODE 250: Performed by: INTERNAL MEDICINE

## 2018-02-14 PROCEDURE — 36415 COLL VENOUS BLD VENIPUNCTURE: CPT

## 2018-02-14 PROCEDURE — 700102 HCHG RX REV CODE 250 W/ 637 OVERRIDE(OP): Performed by: INTERNAL MEDICINE

## 2018-02-14 PROCEDURE — 700111 HCHG RX REV CODE 636 W/ 250 OVERRIDE (IP): Performed by: HOSPITALIST

## 2018-02-14 PROCEDURE — 700105 HCHG RX REV CODE 258: Performed by: HOSPITALIST

## 2018-02-14 PROCEDURE — 84132 ASSAY OF SERUM POTASSIUM: CPT

## 2018-02-14 PROCEDURE — 700102 HCHG RX REV CODE 250 W/ 637 OVERRIDE(OP): Performed by: HOSPITALIST

## 2018-02-14 PROCEDURE — A9270 NON-COVERED ITEM OR SERVICE: HCPCS | Performed by: INTERNAL MEDICINE

## 2018-02-14 PROCEDURE — A9270 NON-COVERED ITEM OR SERVICE: HCPCS | Performed by: HOSPITALIST

## 2018-02-14 RX ORDER — POTASSIUM CHLORIDE 20 MEQ/1
40 TABLET, EXTENDED RELEASE ORAL 2 TIMES DAILY
Status: DISCONTINUED | OUTPATIENT
Start: 2018-02-14 | End: 2018-02-14

## 2018-02-14 RX ORDER — POTASSIUM CHLORIDE 20 MEQ/1
40 TABLET, EXTENDED RELEASE ORAL ONCE
Status: COMPLETED | OUTPATIENT
Start: 2018-02-14 | End: 2018-02-14

## 2018-02-14 RX ORDER — POTASSIUM CHLORIDE 20 MEQ/1
40 TABLET, EXTENDED RELEASE ORAL 2 TIMES DAILY
Status: COMPLETED | OUTPATIENT
Start: 2018-02-14 | End: 2018-02-14

## 2018-02-14 RX ORDER — SODIUM CHLORIDE AND POTASSIUM CHLORIDE 150; 900 MG/100ML; MG/100ML
INJECTION, SOLUTION INTRAVENOUS CONTINUOUS
Status: DISCONTINUED | OUTPATIENT
Start: 2018-02-14 | End: 2018-02-15

## 2018-02-14 RX ADMIN — ONDANSETRON 4 MG: 2 INJECTION INTRAMUSCULAR; INTRAVENOUS at 09:25

## 2018-02-14 RX ADMIN — METOCLOPRAMIDE HYDROCHLORIDE 5 MG: 5 TABLET ORAL at 05:38

## 2018-02-14 RX ADMIN — POTASSIUM CHLORIDE AND SODIUM CHLORIDE: 900; 150 INJECTION, SOLUTION INTRAVENOUS at 11:37

## 2018-02-14 RX ADMIN — Medication 25 MG: at 15:57

## 2018-02-14 RX ADMIN — NITROFURANTOIN (MONOHYDRATE/MACROCRYSTALS) 100 MG: 75; 25 CAPSULE ORAL at 17:16

## 2018-02-14 RX ADMIN — POTASSIUM CHLORIDE 40 MEQ: 1500 TABLET, EXTENDED RELEASE ORAL at 23:03

## 2018-02-14 RX ADMIN — PROMETHAZINE HYDROCHLORIDE 25 MG: 12.5 SUPPOSITORY RECTAL at 12:59

## 2018-02-14 RX ADMIN — ONDANSETRON 4 MG: 2 INJECTION INTRAMUSCULAR; INTRAVENOUS at 15:57

## 2018-02-14 RX ADMIN — METOCLOPRAMIDE HYDROCHLORIDE 5 MG: 5 TABLET ORAL at 17:16

## 2018-02-14 RX ADMIN — POTASSIUM CHLORIDE 40 MEQ: 1500 TABLET, EXTENDED RELEASE ORAL at 09:26

## 2018-02-14 RX ADMIN — NITROFURANTOIN (MONOHYDRATE/MACROCRYSTALS) 100 MG: 75; 25 CAPSULE ORAL at 09:26

## 2018-02-14 RX ADMIN — Medication 25 MG: at 09:26

## 2018-02-14 RX ADMIN — ONDANSETRON 4 MG: 2 INJECTION INTRAMUSCULAR; INTRAVENOUS at 04:01

## 2018-02-14 RX ADMIN — SODIUM CHLORIDE: 9 INJECTION, SOLUTION INTRAVENOUS at 04:04

## 2018-02-14 RX ADMIN — DOXYLAMINE SUCCINATE 25 MG: 25 TABLET ORAL at 23:03

## 2018-02-14 RX ADMIN — METOCLOPRAMIDE HYDROCHLORIDE 5 MG: 5 TABLET ORAL at 21:03

## 2018-02-14 RX ADMIN — SODIUM CHLORIDE: 9 INJECTION, SOLUTION INTRAVENOUS at 09:23

## 2018-02-14 RX ADMIN — METOCLOPRAMIDE HYDROCHLORIDE 5 MG: 5 TABLET ORAL at 11:36

## 2018-02-14 RX ADMIN — POTASSIUM CHLORIDE 40 MEQ: 1500 TABLET, EXTENDED RELEASE ORAL at 05:38

## 2018-02-14 RX ADMIN — ONDANSETRON 4 MG: 2 INJECTION INTRAMUSCULAR; INTRAVENOUS at 23:05

## 2018-02-14 RX ADMIN — STANDARDIZED SENNA CONCENTRATE AND DOCUSATE SODIUM 2 TABLET: 8.6; 5 TABLET, FILM COATED ORAL at 23:03

## 2018-02-14 RX ADMIN — Medication 25 MG: at 23:03

## 2018-02-14 ASSESSMENT — PATIENT HEALTH QUESTIONNAIRE - PHQ9
SUM OF ALL RESPONSES TO PHQ9 QUESTIONS 1 AND 2: 0
SUM OF ALL RESPONSES TO PHQ QUESTIONS 1-9: 0
1. LITTLE INTEREST OR PLEASURE IN DOING THINGS: NOT AT ALL

## 2018-02-14 ASSESSMENT — ENCOUNTER SYMPTOMS
DIZZINESS: 0
DIARRHEA: 0
NAUSEA: 1
CONSTIPATION: 0
HEADACHES: 0
VOMITING: 0
DOUBLE VISION: 0
CHILLS: 0
BLURRED VISION: 0
SHORTNESS OF BREATH: 0
FEVER: 0
ABDOMINAL PAIN: 0
COUGH: 0

## 2018-02-14 ASSESSMENT — PAIN SCALES - GENERAL: PAINLEVEL_OUTOF10: 0

## 2018-02-14 NOTE — PROGRESS NOTES
[facility] Hospitalist Progress Note    Date of Service: 2018    Chief Complaint  22 y.o. female admitted 2018 with 14 week pregnancy with N/V and hematemesis. Found with UTI.    Interval Problem Update  Potassium low, repleting, recheck this afternoon  Tolerating her diet, will advance  Dysuria improving    Consultants/Specialty  None    Disposition  Home pending PO intake        Review of Systems   Constitutional: Negative for chills and fever.   Eyes: Negative for blurred vision and double vision.   Respiratory: Negative for cough and shortness of breath.    Cardiovascular: Negative for chest pain.   Gastrointestinal: Positive for nausea. Negative for abdominal pain, constipation, diarrhea and vomiting.   Genitourinary: Positive for dysuria, frequency and urgency.   Skin: Negative for rash.   Neurological: Negative for dizziness and headaches.      Physical Exam  Laboratory/Imaging   Hemodynamics  Temp (24hrs), Av.7 °C (98 °F), Min:36.4 °C (97.5 °F), Max:36.8 °C (98.2 °F)   Temperature: 36.4 °C (97.5 °F)  Pulse  Av.8  Min: 97  Max: 165   Blood Pressure: 115/74      Respiratory      Respiration: 16, Pulse Oximetry: 94 %             Fluids    Intake/Output Summary (Last 24 hours) at 18 1336  Last data filed at 18 1000   Gross per 24 hour   Intake             2340 ml   Output                0 ml   Net             2340 ml       Nutrition  Orders Placed This Encounter   Procedures   • Diet Order     Standing Status:   Standing     Number of Occurrences:   1     Order Specific Question:   Diet:     Answer:   Regular [1]     Physical Exam   Constitutional: She is oriented to person, place, and time. She appears well-developed and well-nourished. She is cooperative.   HENT:   Head: Normocephalic and atraumatic.   Eyes: Conjunctivae and EOM are normal.   Cardiovascular: Normal rate, regular rhythm and normal heart sounds.    Pulmonary/Chest: Effort normal. She has no wheezes.   Abdominal: Soft.  She exhibits no distension. There is no tenderness. There is no rebound and no guarding.   Musculoskeletal: She exhibits no edema.   Neurological: She is alert and oriented to person, place, and time.   Skin: Skin is warm and dry.   Nursing note and vitals reviewed.      Recent Labs      18   1528  18   0613   WBC  12.8*  16.3*   RBC  6.60*  5.48*   HEMOGLOBIN  17.6*  14.3   HEMATOCRIT  52.3*  44.4   MCV  79.5*  81.0*   MCH  26.5*  26.1*   MCHC  33.3*  32.2*   RDW  53.1*  54.0*   PLATELETCT  395  280   MPV  11.5  11.3     Recent Labs      18   1528  18   0613  18   0405  18   1039   SODIUM  139  145  144   --    POTASSIUM  4.3  3.1*  2.7*  3.1*   CHLORIDE  106  111  112   --    CO2  13*  22  20   --    GLUCOSE  130*  104*  91   --    BUN  8  5*  <3*   --    CREATININE  0.66  0.46*  0.41*   --    CALCIUM  10.9*  9.8  8.8   --                       Assessment/Plan     * Hyperemesis gravidarum- (present on admission)   Assessment & Plan    Recurrent   Improved, advance diet  unisom qhs, reglan QID  zofran and phenergan PRN  Her obgyn outpatient  Dr. Tilley [phone], consider consultation if symptoms don't improve.        UTI (urinary tract infection)- (present on admission)   Assessment & Plan    Positive UA and symptomatic.  Started macrobid.  F/u urine culture        Hypokalemia- (present on admission)   Assessment & Plan    Repleted and monitoring        Metabolic acidosis- (present on admission)   Assessment & Plan    As a result of nausea/vommiting.  Resolved with IVF        Microcytic anemia   Assessment & Plan    Borderline microcytic anemia  Check iron panel  Recommend continuation of prenatal vitamins were able to tolerate by mouth        Pregnant- (present on admission)   Assessment & Plan    A0 14 weeks pregnant.           Quality-Core Measures   Reviewed items::  EKG reviewed, Labs reviewed, Medications reviewed and Radiology images reviewed  Lottie  catheter::  No Tafoya  DVT prophylaxis - mechanical:  SCDs

## 2018-02-14 NOTE — CARE PLAN
Problem: Safety  Goal: Will remain free from falls  Bed alarm in place, bed in low and locked position, non-slip socks on patient, hourly rounding in place    Problem: Venous Thromboembolism (VTW)/Deep Vein Thrombosis (DVT) Prevention:  Goal: Patient will participate in Venous Thrombosis (VTE)/Deep Vein Thrombosis (DVT)Prevention Measures  Pt ambulating occasionally, encouraged to perform ROM when in bed for long periods of time.

## 2018-02-14 NOTE — CARE PLAN
Problem: Communication  Goal: The ability to communicate needs accurately and effectively will improve  Outcome: PROGRESSING AS EXPECTED  Patient able to use call bell appropriately and express needs.

## 2018-02-14 NOTE — PROGRESS NOTES
Patient alert and oriented, vss, denies pain, up self and steady on feet. Patient potassium 2.7 this morning, total of 80meq PO given this far for replacement. Patient complains of nausea- IV zofran given. Patient continues on IV fluids. Will continue to monitor.

## 2018-02-14 NOTE — PROGRESS NOTES
Lab called with critical result of Potassium of 2.7 at 0508.   Dr. Ramirez notified of critical lab result at 0513.  Critical lab result read back by Dr. Ramirez. Received order for 40mg K PO

## 2018-02-14 NOTE — CARE PLAN
Problem: Safety  Goal: Will remain free from injury  Outcome: PROGRESSING AS EXPECTED  Patient steady on feet and is not a fall risk at this time.

## 2018-02-15 LAB
ANION GAP SERPL CALC-SCNC: 9 MMOL/L (ref 0–11.9)
BACTERIA UR CULT: ABNORMAL
BACTERIA UR CULT: ABNORMAL
BUN SERPL-MCNC: <3 MG/DL (ref 8–22)
CALCIUM SERPL-MCNC: 8.5 MG/DL (ref 8.5–10.5)
CHLORIDE SERPL-SCNC: 109 MMOL/L (ref 96–112)
CO2 SERPL-SCNC: 22 MMOL/L (ref 20–33)
CREAT SERPL-MCNC: 0.29 MG/DL (ref 0.5–1.4)
GLUCOSE SERPL-MCNC: 78 MG/DL (ref 65–99)
POTASSIUM SERPL-SCNC: 3.3 MMOL/L (ref 3.6–5.5)
SIGNIFICANT IND 70042: ABNORMAL
SITE SITE: ABNORMAL
SODIUM SERPL-SCNC: 140 MMOL/L (ref 135–145)
SOURCE SOURCE: ABNORMAL

## 2018-02-15 PROCEDURE — 99233 SBSQ HOSP IP/OBS HIGH 50: CPT | Performed by: INTERNAL MEDICINE

## 2018-02-15 PROCEDURE — 770006 HCHG ROOM/CARE - MED/SURG/GYN SEMI*

## 2018-02-15 PROCEDURE — A9270 NON-COVERED ITEM OR SERVICE: HCPCS | Performed by: INTERNAL MEDICINE

## 2018-02-15 PROCEDURE — 700102 HCHG RX REV CODE 250 W/ 637 OVERRIDE(OP): Performed by: INTERNAL MEDICINE

## 2018-02-15 PROCEDURE — 36415 COLL VENOUS BLD VENIPUNCTURE: CPT

## 2018-02-15 PROCEDURE — 700111 HCHG RX REV CODE 636 W/ 250 OVERRIDE (IP): Performed by: HOSPITALIST

## 2018-02-15 PROCEDURE — 700105 HCHG RX REV CODE 258: Performed by: INTERNAL MEDICINE

## 2018-02-15 PROCEDURE — 700102 HCHG RX REV CODE 250 W/ 637 OVERRIDE(OP): Performed by: HOSPITALIST

## 2018-02-15 PROCEDURE — 700101 HCHG RX REV CODE 250: Performed by: INTERNAL MEDICINE

## 2018-02-15 PROCEDURE — 80048 BASIC METABOLIC PNL TOTAL CA: CPT

## 2018-02-15 PROCEDURE — A9270 NON-COVERED ITEM OR SERVICE: HCPCS | Performed by: HOSPITALIST

## 2018-02-15 PROCEDURE — 700111 HCHG RX REV CODE 636 W/ 250 OVERRIDE (IP): Performed by: INTERNAL MEDICINE

## 2018-02-15 RX ORDER — POTASSIUM CHLORIDE 20 MEQ/1
40 TABLET, EXTENDED RELEASE ORAL DAILY
Status: COMPLETED | OUTPATIENT
Start: 2018-02-15 | End: 2018-02-15

## 2018-02-15 RX ORDER — SODIUM CHLORIDE AND POTASSIUM CHLORIDE 150; 900 MG/100ML; MG/100ML
INJECTION, SOLUTION INTRAVENOUS CONTINUOUS
Status: DISCONTINUED | OUTPATIENT
Start: 2018-02-15 | End: 2018-02-16

## 2018-02-15 RX ORDER — METOCLOPRAMIDE 5 MG/1
5 TABLET ORAL 3 TIMES DAILY
Status: DISCONTINUED | OUTPATIENT
Start: 2018-02-15 | End: 2018-02-17

## 2018-02-15 RX ORDER — PROCHLORPERAZINE MALEATE 5 MG/1
5 TABLET ORAL 3 TIMES DAILY
Status: DISCONTINUED | OUTPATIENT
Start: 2018-02-15 | End: 2018-02-16

## 2018-02-15 RX ADMIN — DOXYLAMINE SUCCINATE 25 MG: 25 TABLET ORAL at 20:35

## 2018-02-15 RX ADMIN — POTASSIUM CHLORIDE 40 MEQ: 1500 TABLET, EXTENDED RELEASE ORAL at 08:26

## 2018-02-15 RX ADMIN — ONDANSETRON 4 MG: 2 INJECTION INTRAMUSCULAR; INTRAVENOUS at 10:54

## 2018-02-15 RX ADMIN — POTASSIUM CHLORIDE: 2 INJECTION, SOLUTION, CONCENTRATE INTRAVENOUS at 14:13

## 2018-02-15 RX ADMIN — NITROFURANTOIN (MONOHYDRATE/MACROCRYSTALS) 100 MG: 75; 25 CAPSULE ORAL at 17:16

## 2018-02-15 RX ADMIN — PROCHLORPERAZINE MALEATE 5 MG: 5 TABLET, FILM COATED ORAL at 17:17

## 2018-02-15 RX ADMIN — METOCLOPRAMIDE HYDROCHLORIDE 5 MG: 5 TABLET ORAL at 20:27

## 2018-02-15 RX ADMIN — METOCLOPRAMIDE HYDROCHLORIDE 5 MG: 5 TABLET ORAL at 12:01

## 2018-02-15 RX ADMIN — POTASSIUM CHLORIDE AND SODIUM CHLORIDE: 900; 150 INJECTION, SOLUTION INTRAVENOUS at 12:03

## 2018-02-15 RX ADMIN — NITROFURANTOIN (MONOHYDRATE/MACROCRYSTALS) 100 MG: 75; 25 CAPSULE ORAL at 08:27

## 2018-02-15 RX ADMIN — PROMETHAZINE HYDROCHLORIDE 25 MG: 25 TABLET ORAL at 14:14

## 2018-02-15 RX ADMIN — METOCLOPRAMIDE HYDROCHLORIDE 5 MG: 5 TABLET ORAL at 06:48

## 2018-02-15 RX ADMIN — Medication 25 MG: at 08:26

## 2018-02-15 RX ADMIN — PROMETHAZINE HYDROCHLORIDE 12.5 MG: 12.5 SUPPOSITORY RECTAL at 00:37

## 2018-02-15 RX ADMIN — Medication 25 MG: at 14:13

## 2018-02-15 RX ADMIN — Medication 25 MG: at 20:27

## 2018-02-15 ASSESSMENT — ENCOUNTER SYMPTOMS
DOUBLE VISION: 0
NAUSEA: 1
SHORTNESS OF BREATH: 0
FEVER: 0
CHILLS: 0
HEADACHES: 0
CONSTIPATION: 0
DIARRHEA: 0
DIZZINESS: 0
VOMITING: 1
ABDOMINAL PAIN: 1
COUGH: 0
BLURRED VISION: 0

## 2018-02-15 ASSESSMENT — PAIN SCALES - GENERAL
PAINLEVEL_OUTOF10: 0

## 2018-02-15 NOTE — PROGRESS NOTES
[facility] Hospitalist Progress Note    Date of Service: 2/15/2018    Chief Complaint  22 y.o. female admitted 2018 with 14 week pregnancy with N/V and hematemesis. Found with UTI.    Interval Problem Update  K 3.3, repleting  N/V yesterday. Requiring PRN antiemetics. Will add scheduled compazine.  Tolerating some of her meals  Spoke with Dr. Tilley, suggested potassium/thiamine/folate D5 NS bag today    Consultants/Specialty  None    Disposition  Home pending PO intake        Review of Systems   Constitutional: Negative for chills and fever.   Eyes: Negative for blurred vision and double vision.   Respiratory: Negative for cough and shortness of breath.    Cardiovascular: Negative for chest pain.   Gastrointestinal: Positive for abdominal pain, nausea and vomiting. Negative for constipation and diarrhea.   Genitourinary: Negative for dysuria, frequency and urgency.   Skin: Negative for rash.   Neurological: Negative for dizziness and headaches.      Physical Exam  Laboratory/Imaging   Hemodynamics  Temp (24hrs), Av.3 °C (97.3 °F), Min:36.2 °C (97.1 °F), Max:36.4 °C (97.5 °F)   Temperature: 36.2 °C (97.2 °F)  Pulse  Av.9  Min: 86  Max: 165   Blood Pressure: 113/75      Respiratory      Respiration: 16, Pulse Oximetry: 98 %             Fluids    Intake/Output Summary (Last 24 hours) at 02/15/18 1309  Last data filed at 02/15/18 1000   Gross per 24 hour   Intake             4710 ml   Output              600 ml   Net             4110 ml       Nutrition  Orders Placed This Encounter   Procedures   • Diet Order     Standing Status:   Standing     Number of Occurrences:   1     Order Specific Question:   Diet:     Answer:   Regular [1]     Physical Exam   Constitutional: She is oriented to person, place, and time. She appears well-developed and well-nourished. She is cooperative.   HENT:   Head: Normocephalic and atraumatic.   Eyes: Conjunctivae and EOM are normal.   Cardiovascular: Normal rate, regular  rhythm and normal heart sounds.    Pulmonary/Chest: Effort normal. She has no wheezes.   Abdominal: Soft. Bowel sounds are normal. She exhibits no distension. There is no tenderness. There is no rebound and no guarding.   Musculoskeletal: She exhibits no edema.   Neurological: She is alert and oriented to person, place, and time.   Skin: Skin is warm and dry.   Nursing note and vitals reviewed.      Recent Labs      18   1528  18   0613   WBC  12.8*  16.3*   RBC  6.60*  5.48*   HEMOGLOBIN  17.6*  14.3   HEMATOCRIT  52.3*  44.4   MCV  79.5*  81.0*   MCH  26.5*  26.1*   MCHC  33.3*  32.2*   RDW  53.1*  54.0*   PLATELETCT  395  280   MPV  11.5  11.3     Recent Labs      18   0613  18   0405  18   1039  02/15/18   0405   SODIUM  145  144   --   140   POTASSIUM  3.1*  2.7*  3.1*  3.3*   CHLORIDE  111  112   --   109   CO2  22  20   --   22   GLUCOSE  104*  91   --   78   BUN  5*  <3*   --   <3*   CREATININE  0.46*  0.41*   --   0.29*   CALCIUM  9.8  8.8   --   8.5                      Assessment/Plan     * Hyperemesis gravidarum- (present on admission)   Assessment & Plan    Recurrent   Diet as tolerated  unisom qhs, reglan QID  zofran and phenergan PRN  Her obgyn outpatient  Dr. Tilley [phone], spoke with him 2/15 and suggested potassium/thiamine/folate D5 NS bag today        UTI (urinary tract infection)- (present on admission)   Assessment & Plan    Positive UA and symptomatic.  Urine culture had not growth  Complete course of macrobid        Hypokalemia- (present on admission)   Assessment & Plan    Repleted and monitoring        Metabolic acidosis- (present on admission)   Assessment & Plan    As a result of nausea/vommiting.  Resolved with IVF        Microcytic anemia   Assessment & Plan    Borderline microcytic anemia  Recommend continuation of prenatal vitamins were able to tolerate by mouth        Pregnant- (present on admission)   Assessment & Plan    A0 14 weeks  pregnant.           Quality-Core Measures   Reviewed items::  EKG reviewed, Labs reviewed, Medications reviewed and Radiology images reviewed  Tafoya catheter::  No Tafoya  DVT prophylaxis - mechanical:  SCDs

## 2018-02-15 NOTE — CARE PLAN
"Problem: Pain Management  Goal: Pain level will decrease to patient's comfort goal  Outcome: PROGRESSING AS EXPECTED  Pt c/o abdominal pain that states is \"only controlled by reglan and nausea medications\". Both medications offered throughout day. Encouraged the use of hot packs as well.     Problem: Fluid Volume:  Goal: Will maintain balanced intake and output  Outcome: PROGRESSING AS EXPECTED  Pt is not maintaining adequate Intake by mouth. Fluids running as well as oral fluids encouraged.       "

## 2018-02-15 NOTE — PROGRESS NOTES
Patient Educated Regarding Fall Risk and Need for Bed Alarm, Understands and Continues to Refuse. Up self and steady.

## 2018-02-15 NOTE — PROGRESS NOTES
Assume pt care. Pt a/o x3, VSS, No acute issues overnight. Pt rounds Q1-2hr with assessment of 4p's. IV assessment and care given. Pt education provided base on admission, care plan, current medications, and PRN.     Fluids running   Pt is 14 weeks pregnant  Cont to monitor.    K+  3.1 --> 2.7 --> 3.1 --> 3.3  BUN  5 -->  3< --> <3  WBC  12.8 --> 16.3

## 2018-02-15 NOTE — PROGRESS NOTES
Assumed care of pt, A/OX4. C/O abdominal pain. Scheduled reglan given. -N/T, -N/V, -SOB/CP. Updated pt on POC for the day. VSS, on room air, stating within normal limits. All needs met at this time, call light is within reach, bed in lowest position.

## 2018-02-16 PROBLEM — E83.42 HYPOMAGNESEMIA: Status: ACTIVE | Noted: 2018-02-16

## 2018-02-16 LAB
ANION GAP SERPL CALC-SCNC: 9 MMOL/L (ref 0–11.9)
BUN SERPL-MCNC: <3 MG/DL (ref 8–22)
CALCIUM SERPL-MCNC: 8.8 MG/DL (ref 8.5–10.5)
CHLORIDE SERPL-SCNC: 106 MMOL/L (ref 96–112)
CO2 SERPL-SCNC: 23 MMOL/L (ref 20–33)
CREAT SERPL-MCNC: 0.38 MG/DL (ref 0.5–1.4)
GLUCOSE SERPL-MCNC: 70 MG/DL (ref 65–99)
MAGNESIUM SERPL-MCNC: 1.4 MG/DL (ref 1.5–2.5)
POTASSIUM SERPL-SCNC: 3.5 MMOL/L (ref 3.6–5.5)
SODIUM SERPL-SCNC: 138 MMOL/L (ref 135–145)

## 2018-02-16 PROCEDURE — 99232 SBSQ HOSP IP/OBS MODERATE 35: CPT | Performed by: INTERNAL MEDICINE

## 2018-02-16 PROCEDURE — 700101 HCHG RX REV CODE 250: Performed by: INTERNAL MEDICINE

## 2018-02-16 PROCEDURE — 700111 HCHG RX REV CODE 636 W/ 250 OVERRIDE (IP): Performed by: INTERNAL MEDICINE

## 2018-02-16 PROCEDURE — 700102 HCHG RX REV CODE 250 W/ 637 OVERRIDE(OP): Performed by: HOSPITALIST

## 2018-02-16 PROCEDURE — A9270 NON-COVERED ITEM OR SERVICE: HCPCS | Performed by: HOSPITALIST

## 2018-02-16 PROCEDURE — 80048 BASIC METABOLIC PNL TOTAL CA: CPT

## 2018-02-16 PROCEDURE — A9270 NON-COVERED ITEM OR SERVICE: HCPCS | Performed by: INTERNAL MEDICINE

## 2018-02-16 PROCEDURE — 770006 HCHG ROOM/CARE - MED/SURG/GYN SEMI*

## 2018-02-16 PROCEDURE — 83735 ASSAY OF MAGNESIUM: CPT

## 2018-02-16 PROCEDURE — 36415 COLL VENOUS BLD VENIPUNCTURE: CPT

## 2018-02-16 PROCEDURE — 700102 HCHG RX REV CODE 250 W/ 637 OVERRIDE(OP): Performed by: INTERNAL MEDICINE

## 2018-02-16 RX ORDER — MAGNESIUM SULFATE HEPTAHYDRATE 40 MG/ML
4 INJECTION, SOLUTION INTRAVENOUS ONCE
Status: COMPLETED | OUTPATIENT
Start: 2018-02-16 | End: 2018-02-16

## 2018-02-16 RX ORDER — POTASSIUM CHLORIDE 20 MEQ/1
40 TABLET, EXTENDED RELEASE ORAL DAILY
Status: COMPLETED | OUTPATIENT
Start: 2018-02-16 | End: 2018-02-16

## 2018-02-16 RX ORDER — PROCHLORPERAZINE MALEATE 5 MG/1
5 TABLET ORAL 2 TIMES DAILY
Status: DISCONTINUED | OUTPATIENT
Start: 2018-02-16 | End: 2018-02-18 | Stop reason: HOSPADM

## 2018-02-16 RX ADMIN — Medication 25 MG: at 21:13

## 2018-02-16 RX ADMIN — PROCHLORPERAZINE MALEATE 5 MG: 5 TABLET, FILM COATED ORAL at 05:10

## 2018-02-16 RX ADMIN — STANDARDIZED SENNA CONCENTRATE AND DOCUSATE SODIUM 2 TABLET: 8.6; 5 TABLET, FILM COATED ORAL at 08:12

## 2018-02-16 RX ADMIN — Medication 25 MG: at 08:12

## 2018-02-16 RX ADMIN — Medication 25 MG: at 15:26

## 2018-02-16 RX ADMIN — POTASSIUM CHLORIDE AND SODIUM CHLORIDE: 900; 150 INJECTION, SOLUTION INTRAVENOUS at 01:21

## 2018-02-16 RX ADMIN — METOCLOPRAMIDE HYDROCHLORIDE 5 MG: 5 TABLET ORAL at 15:26

## 2018-02-16 RX ADMIN — POTASSIUM CHLORIDE AND SODIUM CHLORIDE: 900; 150 INJECTION, SOLUTION INTRAVENOUS at 08:22

## 2018-02-16 RX ADMIN — NITROFURANTOIN (MONOHYDRATE/MACROCRYSTALS) 100 MG: 75; 25 CAPSULE ORAL at 08:13

## 2018-02-16 RX ADMIN — METOCLOPRAMIDE HYDROCHLORIDE 5 MG: 5 TABLET ORAL at 21:12

## 2018-02-16 RX ADMIN — PROCHLORPERAZINE MALEATE 5 MG: 5 TABLET, FILM COATED ORAL at 21:13

## 2018-02-16 RX ADMIN — PROCHLORPERAZINE MALEATE 5 MG: 5 TABLET, FILM COATED ORAL at 12:01

## 2018-02-16 RX ADMIN — MAGNESIUM SULFATE IN WATER 4 G: 40 INJECTION, SOLUTION INTRAVENOUS at 08:12

## 2018-02-16 RX ADMIN — NITROFURANTOIN (MONOHYDRATE/MACROCRYSTALS) 100 MG: 75; 25 CAPSULE ORAL at 18:12

## 2018-02-16 RX ADMIN — POTASSIUM CHLORIDE 40 MEQ: 1500 TABLET, EXTENDED RELEASE ORAL at 08:13

## 2018-02-16 RX ADMIN — METOCLOPRAMIDE HYDROCHLORIDE 5 MG: 5 TABLET ORAL at 08:13

## 2018-02-16 ASSESSMENT — ENCOUNTER SYMPTOMS
HEADACHES: 0
VOMITING: 0
BLURRED VISION: 0
NAUSEA: 1
CHILLS: 0
DOUBLE VISION: 0
DIZZINESS: 0
FEVER: 0
COUGH: 0
SHORTNESS OF BREATH: 0
CONSTIPATION: 0
DIARRHEA: 0
ABDOMINAL PAIN: 0

## 2018-02-16 ASSESSMENT — PATIENT HEALTH QUESTIONNAIRE - PHQ9
SUM OF ALL RESPONSES TO PHQ9 QUESTIONS 1 AND 2: 0
1. LITTLE INTEREST OR PLEASURE IN DOING THINGS: NOT AT ALL
SUM OF ALL RESPONSES TO PHQ QUESTIONS 1-9: 0
2. FEELING DOWN, DEPRESSED, IRRITABLE, OR HOPELESS: NOT AT ALL

## 2018-02-16 ASSESSMENT — PAIN SCALES - GENERAL: PAINLEVEL_OUTOF10: 0

## 2018-02-16 NOTE — PROGRESS NOTES
Patient Educated Regarding Fall Risk and Need for Bed Alarm, Understands and Continues to Refuse. Uses call light appropriately

## 2018-02-16 NOTE — PROGRESS NOTES
Assumed care of pt, A/OX4. No c/o pain at this time, -N/T, -N/V, -SOB/CP. IV running NS with 20 KCL at 100mL/hr. VSS< on room air, stating within normal limits. Updated pt on POC for the day. All needs met at this time, call light within reach, bed in lowest position.

## 2018-02-16 NOTE — PROGRESS NOTES
A&Ox4. Denies pain. No complaints verbalized. Banana bag still infusing to left fa. Will hang other ordered continuous fluids next. See mar.  Assessment per flowsheet. Safety measures in placed, maintained. Will continue to monitor pt until end of shift.

## 2018-02-16 NOTE — CARE PLAN
Problem: Bowel/Gastric:  Goal: Normal bowel function is maintained or improved  Outcome: PROGRESSING SLOWER THAN EXPECTED  Pt hasn't had a BM since 2/13/18. Educated pt that if she doesn't have a BM by this morning, that this RN would highly recommend taking Miralax. Pt verbally agrees.     Problem: Mobility  Goal: Risk for activity intolerance will decrease  Outcome: PROGRESSING AS EXPECTED  Encouraging pt to get up and ambulate at least 3x a day outside of room.

## 2018-02-16 NOTE — PROGRESS NOTES
[facility] Hospitalist Progress Note    Date of Service: 2018    Chief Complaint  22 y.o. female admitted 2018 with 14 week pregnancy with N/V and hematemesis. Found with UTI.    Interval Problem Update  K and Mg repleted  Nausea improved, tolerated more food    Consultants/Specialty  None    Disposition  Home pending PO intake        Review of Systems   Constitutional: Negative for chills and fever.   Eyes: Negative for blurred vision and double vision.   Respiratory: Negative for cough and shortness of breath.    Cardiovascular: Negative for chest pain.   Gastrointestinal: Positive for nausea. Negative for abdominal pain, constipation, diarrhea and vomiting.   Genitourinary: Negative for dysuria, frequency and urgency.   Skin: Negative for rash.   Neurological: Negative for dizziness and headaches.      Physical Exam  Laboratory/Imaging   Hemodynamics  Temp (24hrs), Av.4 °C (97.6 °F), Min:36.2 °C (97.2 °F), Max:36.8 °C (98.3 °F)   Temperature: 36.3 °C (97.3 °F)  Pulse  Av.8  Min: 83  Max: 165   Blood Pressure: (!) 96/58 (RN Notified)      Respiratory      Respiration: 18, Pulse Oximetry: 97 %             Fluids    Intake/Output Summary (Last 24 hours) at 18 1028  Last data filed at 18 0100   Gross per 24 hour   Intake              633 ml   Output                0 ml   Net              633 ml       Nutrition  Orders Placed This Encounter   Procedures   • Diet Order     Standing Status:   Standing     Number of Occurrences:   1     Order Specific Question:   Diet:     Answer:   Regular [1]     Physical Exam   Constitutional: She is oriented to person, place, and time. She appears well-developed and well-nourished. She is cooperative.   HENT:   Head: Normocephalic and atraumatic.   Eyes: Conjunctivae and EOM are normal.   Cardiovascular: Normal rate, regular rhythm and normal heart sounds.    Pulmonary/Chest: Effort normal. She has no wheezes.   Abdominal: Soft. Bowel sounds are normal.  She exhibits no distension. There is no tenderness. There is no rebound and no guarding.   Musculoskeletal: She exhibits no edema.   Neurological: She is alert and oriented to person, place, and time.   Skin: Skin is warm and dry.   Nursing note and vitals reviewed.          Recent Labs      18   0405  18   1039  02/15/18   0405  18   0301   SODIUM  144   --   140  138   POTASSIUM  2.7*  3.1*  3.3*  3.5*   CHLORIDE  112   --   109  106   CO2  20   --   22  23   GLUCOSE  91   --   78  70   BUN  <3*   --   <3*  <3*   CREATININE  0.41*   --   0.29*  0.38*   CALCIUM  8.8   --   8.5  8.8                      Assessment/Plan     * Hyperemesis gravidarum- (present on admission)   Assessment & Plan    Recurrent   Diet as tolerated  unisom qhs, reglan TID, compazine TID  zofran and phenergan PRN  Her obgyn outpatient  Dr. Tilley [phone], spoke with him 2/15         UTI (urinary tract infection)- (present on admission)   Assessment & Plan    Positive UA and symptomatic.  Urine culture growing gardnerella vaginalis  Complete course of macrobid        Hypokalemia- (present on admission)   Assessment & Plan    Repleted and monitoring        Metabolic acidosis- (present on admission)   Assessment & Plan    As a result of nausea/vommiting.  Resolved with IVF        Hypomagnesemia   Assessment & Plan    Repleting and monitoring        Microcytic anemia   Assessment & Plan    Borderline microcytic anemia  Recommend continuation of prenatal vitamins were able to tolerate by mouth        Pregnant- (present on admission)   Assessment & Plan    A0 14 weeks pregnant.           Quality-Core Measures   Reviewed items::  EKG reviewed, Labs reviewed, Medications reviewed and Radiology images reviewed  Tafoya catheter::  No Tafoya  DVT prophylaxis - mechanical:  SCDs

## 2018-02-17 LAB
ANION GAP SERPL CALC-SCNC: 7 MMOL/L (ref 0–11.9)
BASOPHILS # BLD AUTO: 0.5 % (ref 0–1.8)
BASOPHILS # BLD: 0.03 K/UL (ref 0–0.12)
BUN SERPL-MCNC: 4 MG/DL (ref 8–22)
CALCIUM SERPL-MCNC: 8.6 MG/DL (ref 8.5–10.5)
CHLORIDE SERPL-SCNC: 104 MMOL/L (ref 96–112)
CO2 SERPL-SCNC: 22 MMOL/L (ref 20–33)
CREAT SERPL-MCNC: 0.36 MG/DL (ref 0.5–1.4)
EOSINOPHIL # BLD AUTO: 0.07 K/UL (ref 0–0.51)
EOSINOPHIL NFR BLD: 1.1 % (ref 0–6.9)
ERYTHROCYTE [DISTWIDTH] IN BLOOD BY AUTOMATED COUNT: 53.6 FL (ref 35.9–50)
GLUCOSE SERPL-MCNC: 80 MG/DL (ref 65–99)
HCT VFR BLD AUTO: 41 % (ref 37–47)
HGB BLD-MCNC: 12.9 G/DL (ref 12–16)
IMM GRANULOCYTES # BLD AUTO: 0.08 K/UL (ref 0–0.11)
IMM GRANULOCYTES NFR BLD AUTO: 1.3 % (ref 0–0.9)
LYMPHOCYTES # BLD AUTO: 1.63 K/UL (ref 1–4.8)
LYMPHOCYTES NFR BLD: 25.7 % (ref 22–41)
MAGNESIUM SERPL-MCNC: 2 MG/DL (ref 1.5–2.5)
MCH RBC QN AUTO: 26.1 PG (ref 27–33)
MCHC RBC AUTO-ENTMCNC: 31.5 G/DL (ref 33.6–35)
MCV RBC AUTO: 82.8 FL (ref 81.4–97.8)
MONOCYTES # BLD AUTO: 0.41 K/UL (ref 0–0.85)
MONOCYTES NFR BLD AUTO: 6.5 % (ref 0–13.4)
NEUTROPHILS # BLD AUTO: 4.13 K/UL (ref 2–7.15)
NEUTROPHILS NFR BLD: 64.9 % (ref 44–72)
NRBC # BLD AUTO: 0 K/UL
NRBC BLD-RTO: 0 /100 WBC
PLATELET # BLD AUTO: 193 K/UL (ref 164–446)
PMV BLD AUTO: 11.9 FL (ref 9–12.9)
POTASSIUM SERPL-SCNC: 3.4 MMOL/L (ref 3.6–5.5)
RBC # BLD AUTO: 4.95 M/UL (ref 4.2–5.4)
SODIUM SERPL-SCNC: 133 MMOL/L (ref 135–145)
WBC # BLD AUTO: 6.4 K/UL (ref 4.8–10.8)

## 2018-02-17 PROCEDURE — 700102 HCHG RX REV CODE 250 W/ 637 OVERRIDE(OP): Performed by: HOSPITALIST

## 2018-02-17 PROCEDURE — 36415 COLL VENOUS BLD VENIPUNCTURE: CPT

## 2018-02-17 PROCEDURE — A9270 NON-COVERED ITEM OR SERVICE: HCPCS | Performed by: INTERNAL MEDICINE

## 2018-02-17 PROCEDURE — 700102 HCHG RX REV CODE 250 W/ 637 OVERRIDE(OP): Performed by: INTERNAL MEDICINE

## 2018-02-17 PROCEDURE — 770006 HCHG ROOM/CARE - MED/SURG/GYN SEMI*

## 2018-02-17 PROCEDURE — A9270 NON-COVERED ITEM OR SERVICE: HCPCS | Performed by: HOSPITALIST

## 2018-02-17 PROCEDURE — 83735 ASSAY OF MAGNESIUM: CPT

## 2018-02-17 PROCEDURE — 99232 SBSQ HOSP IP/OBS MODERATE 35: CPT | Performed by: INTERNAL MEDICINE

## 2018-02-17 PROCEDURE — 80048 BASIC METABOLIC PNL TOTAL CA: CPT

## 2018-02-17 PROCEDURE — 85025 COMPLETE CBC W/AUTO DIFF WBC: CPT

## 2018-02-17 RX ORDER — POTASSIUM CHLORIDE 20 MEQ/1
40 TABLET, EXTENDED RELEASE ORAL DAILY
Status: COMPLETED | OUTPATIENT
Start: 2018-02-17 | End: 2018-02-17

## 2018-02-17 RX ADMIN — NITROFURANTOIN (MONOHYDRATE/MACROCRYSTALS) 100 MG: 75; 25 CAPSULE ORAL at 09:17

## 2018-02-17 RX ADMIN — DOXYLAMINE SUCCINATE 25 MG: 25 TABLET ORAL at 21:34

## 2018-02-17 RX ADMIN — NITROFURANTOIN (MONOHYDRATE/MACROCRYSTALS) 100 MG: 75; 25 CAPSULE ORAL at 17:26

## 2018-02-17 RX ADMIN — POTASSIUM CHLORIDE 40 MEQ: 1500 TABLET, EXTENDED RELEASE ORAL at 09:16

## 2018-02-17 RX ADMIN — Medication 25 MG: at 21:34

## 2018-02-17 RX ADMIN — PROCHLORPERAZINE MALEATE 5 MG: 5 TABLET, FILM COATED ORAL at 21:34

## 2018-02-17 RX ADMIN — PROCHLORPERAZINE MALEATE 5 MG: 5 TABLET, FILM COATED ORAL at 09:17

## 2018-02-17 RX ADMIN — STANDARDIZED SENNA CONCENTRATE AND DOCUSATE SODIUM 2 TABLET: 8.6; 5 TABLET, FILM COATED ORAL at 09:16

## 2018-02-17 RX ADMIN — Medication 25 MG: at 09:17

## 2018-02-17 RX ADMIN — Medication 25 MG: at 15:52

## 2018-02-17 ASSESSMENT — ENCOUNTER SYMPTOMS
NAUSEA: 1
HEADACHES: 0
FEVER: 0
CONSTIPATION: 0
COUGH: 0
DIZZINESS: 0
SHORTNESS OF BREATH: 0
ABDOMINAL PAIN: 0
CHILLS: 0
VOMITING: 0
DIARRHEA: 0

## 2018-02-17 ASSESSMENT — PAIN SCALES - GENERAL
PAINLEVEL_OUTOF10: 0
PAINLEVEL_OUTOF10: 0

## 2018-02-17 NOTE — PROGRESS NOTES
Assumed care of pt, A/OX4. No c/o pain, -N/T, -N/V, -SOB/CP. Pt states she talked with the dotor and doesn't feel quite ready to leave yet and would like to wait until tomorrow to see how she feels. Updated pt on POC for the day. No other needs at this time, call light is within reach, bed is in lowest position, hourly rounding continued.

## 2018-02-17 NOTE — CARE PLAN
Problem: Infection  Goal: Will remain free from infection  Outcome: MET Date Met: 02/17/18  Pt demonstrated washing hands after using the restroom.     Problem: Bowel/Gastric:  Goal: Normal bowel function is maintained or improved  Outcome: PROGRESSING SLOWER THAN EXPECTED  Pt received stool softener this am. Still no bowel movement. Miralax will be given

## 2018-02-17 NOTE — PROGRESS NOTES
[facility] Hospitalist Progress Note    Date of Service: 2018    Chief Complaint  22 y.o. female admitted 2018 with 14 week pregnancy with N/V and hematemesis. Found with UTI.    Interval Problem Update  Nausea improved, no vomiting  Eating less than 50% of meals    Consultants/Specialty  None    Disposition  Home pending PO intake        Review of Systems   Constitutional: Negative for chills and fever.   Respiratory: Negative for cough and shortness of breath.    Cardiovascular: Negative for chest pain.   Gastrointestinal: Positive for nausea. Negative for abdominal pain, constipation, diarrhea and vomiting.   Genitourinary: Negative for dysuria, frequency and urgency.   Neurological: Negative for dizziness and headaches.      Physical Exam  Laboratory/Imaging   Hemodynamics  Temp (24hrs), Av.4 °C (97.5 °F), Min:36.1 °C (97 °F), Max:36.6 °C (97.8 °F)   Temperature: 36.4 °C (97.6 °F)  Pulse  Av.6  Min: 69  Max: 165   Blood Pressure: (!) 89/52 (RN Notified)      Respiratory      Respiration: 14, Pulse Oximetry: 95 %             Fluids    Intake/Output Summary (Last 24 hours) at 18 1022  Last data filed at 18 1008   Gross per 24 hour   Intake             1200 ml   Output                0 ml   Net             1200 ml       Nutrition  Orders Placed This Encounter   Procedures   • Diet Order     Standing Status:   Standing     Number of Occurrences:   1     Order Specific Question:   Diet:     Answer:   Regular [1]     Physical Exam   Constitutional: She is oriented to person, place, and time. She appears well-developed and well-nourished. She is cooperative.   HENT:   Head: Normocephalic and atraumatic.   Eyes: Conjunctivae and EOM are normal.   Cardiovascular: Normal rate, regular rhythm and normal heart sounds.    Pulmonary/Chest: Effort normal. She has no wheezes.   Abdominal: Soft. Bowel sounds are normal. She exhibits no distension. There is no tenderness. There is no rebound and no  guarding.   Musculoskeletal: She exhibits no edema.   Neurological: She is alert and oriented to person, place, and time.   Skin: Skin is warm and dry.   Nursing note and vitals reviewed.      Recent Labs      18   0320   WBC  6.4   RBC  4.95   HEMOGLOBIN  12.9   HEMATOCRIT  41.0   MCV  82.8   MCH  26.1*   MCHC  31.5*   RDW  53.6*   PLATELETCT  193   MPV  11.9     Recent Labs      02/15/18   0405  18   0301  18   0320   SODIUM  140  138  133*   POTASSIUM  3.3*  3.5*  3.4*   CHLORIDE  109  106  104   CO2  22  23  22   GLUCOSE  78  70  80   BUN  <3*  <3*  4*   CREATININE  0.29*  0.38*  0.36*   CALCIUM  8.5  8.8  8.6                      Assessment/Plan     * Hyperemesis gravidarum- (present on admission)   Assessment & Plan    Recurrent   Diet as tolerated  unisom qhs, compazine TID  zofran and phenergan PRN  Her obgyn outpatient  Dr. Tilley [phone], spoke with him 2/15         UTI (urinary tract infection)- (present on admission)   Assessment & Plan    Positive UA and symptomatic.  Urine culture growing gardnerella vaginalis  Complete course of macrobid        Hypokalemia- (present on admission)   Assessment & Plan    Repleted and monitoring        Metabolic acidosis- (present on admission)   Assessment & Plan    As a result of nausea/vommiting.  Resolved with IVF        Hypomagnesemia   Assessment & Plan    Repleting and monitoring        Microcytic anemia   Assessment & Plan    Borderline microcytic anemia  Recommend continuation of prenatal vitamins were able to tolerate by mouth        Pregnant- (present on admission)   Assessment & Plan    A0 14 weeks pregnant.           Quality-Core Measures   Reviewed items::  EKG reviewed, Labs reviewed, Medications reviewed and Radiology images reviewed  Tafoya catheter::  No Tafoya  DVT prophylaxis - mechanical:  SCDs

## 2018-02-17 NOTE — PROGRESS NOTES
Patient Educated Regarding Fall Risk and Need for Bed Alarm, Understands and Continues to Refuse. Uses call light appropriately.

## 2018-02-18 VITALS
TEMPERATURE: 97.5 F | OXYGEN SATURATION: 97 % | SYSTOLIC BLOOD PRESSURE: 98 MMHG | RESPIRATION RATE: 16 BRPM | DIASTOLIC BLOOD PRESSURE: 74 MMHG | WEIGHT: 153.66 LBS | HEART RATE: 102 BPM | BODY MASS INDEX: 27.23 KG/M2 | HEIGHT: 63 IN

## 2018-02-18 PROBLEM — E83.42 HYPOMAGNESEMIA: Status: RESOLVED | Noted: 2018-02-16 | Resolved: 2018-02-18

## 2018-02-18 PROBLEM — O21.0 HYPEREMESIS GRAVIDARUM: Status: RESOLVED | Noted: 2018-01-30 | Resolved: 2018-02-18

## 2018-02-18 PROBLEM — D50.9 MICROCYTIC ANEMIA: Status: RESOLVED | Noted: 2018-02-12 | Resolved: 2018-02-18

## 2018-02-18 PROBLEM — E87.20 METABOLIC ACIDOSIS: Status: RESOLVED | Noted: 2018-01-30 | Resolved: 2018-02-18

## 2018-02-18 LAB
ANION GAP SERPL CALC-SCNC: 8 MMOL/L (ref 0–11.9)
BUN SERPL-MCNC: 4 MG/DL (ref 8–22)
CALCIUM SERPL-MCNC: 8.8 MG/DL (ref 8.5–10.5)
CHLORIDE SERPL-SCNC: 105 MMOL/L (ref 96–112)
CO2 SERPL-SCNC: 22 MMOL/L (ref 20–33)
CREAT SERPL-MCNC: 0.27 MG/DL (ref 0.5–1.4)
GLUCOSE SERPL-MCNC: 106 MG/DL (ref 65–99)
MAGNESIUM SERPL-MCNC: 1.6 MG/DL (ref 1.5–2.5)
POTASSIUM SERPL-SCNC: 3.5 MMOL/L (ref 3.6–5.5)
SODIUM SERPL-SCNC: 135 MMOL/L (ref 135–145)

## 2018-02-18 PROCEDURE — 80048 BASIC METABOLIC PNL TOTAL CA: CPT

## 2018-02-18 PROCEDURE — A9270 NON-COVERED ITEM OR SERVICE: HCPCS | Performed by: INTERNAL MEDICINE

## 2018-02-18 PROCEDURE — 99239 HOSP IP/OBS DSCHRG MGMT >30: CPT | Performed by: INTERNAL MEDICINE

## 2018-02-18 PROCEDURE — 700102 HCHG RX REV CODE 250 W/ 637 OVERRIDE(OP): Performed by: INTERNAL MEDICINE

## 2018-02-18 PROCEDURE — A9270 NON-COVERED ITEM OR SERVICE: HCPCS | Performed by: HOSPITALIST

## 2018-02-18 PROCEDURE — 83735 ASSAY OF MAGNESIUM: CPT

## 2018-02-18 PROCEDURE — 700102 HCHG RX REV CODE 250 W/ 637 OVERRIDE(OP): Performed by: HOSPITALIST

## 2018-02-18 PROCEDURE — 36415 COLL VENOUS BLD VENIPUNCTURE: CPT

## 2018-02-18 RX ORDER — AMOXICILLIN 500 MG/1
500 CAPSULE ORAL 2 TIMES DAILY
Qty: 10 CAP | Refills: 0 | Status: SHIPPED | OUTPATIENT
Start: 2018-02-18 | End: 2018-02-23

## 2018-02-18 RX ORDER — PYRIDOXINE HCL (VITAMIN B6) 25 MG
25 TABLET ORAL 3 TIMES DAILY
Qty: 30 TAB | Refills: 11 | Status: SHIPPED | OUTPATIENT
Start: 2018-02-18

## 2018-02-18 RX ORDER — NITROFURANTOIN 25; 75 MG/1; MG/1
100 CAPSULE ORAL 2 TIMES DAILY WITH MEALS
Qty: 2 CAP | Refills: 0 | Status: SHIPPED | OUTPATIENT
Start: 2018-02-18 | End: 2018-02-18

## 2018-02-18 RX ORDER — ONDANSETRON 4 MG/1
4 TABLET, ORALLY DISINTEGRATING ORAL EVERY 4 HOURS PRN
Qty: 10 TAB | Refills: 0 | Status: SHIPPED | OUTPATIENT
Start: 2018-02-18

## 2018-02-18 RX ORDER — POTASSIUM CHLORIDE 20 MEQ/1
40 TABLET, EXTENDED RELEASE ORAL DAILY
Status: COMPLETED | OUTPATIENT
Start: 2018-02-18 | End: 2018-02-18

## 2018-02-18 RX ORDER — POTASSIUM CHLORIDE 20 MEQ/1
40 TABLET, EXTENDED RELEASE ORAL DAILY
Qty: 6 TAB | Refills: 0 | Status: SHIPPED | OUTPATIENT
Start: 2018-02-18 | End: 2018-02-21

## 2018-02-18 RX ORDER — PROCHLORPERAZINE MALEATE 5 MG/1
5 TABLET ORAL EVERY 6 HOURS PRN
Qty: 20 TAB | Refills: 0 | Status: SHIPPED | OUTPATIENT
Start: 2018-02-18

## 2018-02-18 RX ADMIN — Medication 25 MG: at 13:45

## 2018-02-18 RX ADMIN — PROCHLORPERAZINE MALEATE 5 MG: 5 TABLET, FILM COATED ORAL at 08:50

## 2018-02-18 RX ADMIN — Medication 25 MG: at 08:50

## 2018-02-18 RX ADMIN — NITROFURANTOIN (MONOHYDRATE/MACROCRYSTALS) 100 MG: 75; 25 CAPSULE ORAL at 08:49

## 2018-02-18 RX ADMIN — POTASSIUM CHLORIDE 40 MEQ: 1500 TABLET, EXTENDED RELEASE ORAL at 08:50

## 2018-02-18 ASSESSMENT — PAIN SCALES - GENERAL: PAINLEVEL_OUTOF10: 0

## 2018-02-18 ASSESSMENT — LIFESTYLE VARIABLES: DO YOU DRINK ALCOHOL: NO

## 2018-02-18 NOTE — DISCHARGE SUMMARY
CHIEF COMPLAINT ON ADMISSION  Chief Complaint   Patient presents with   • Pregnancy   • N/V     Hyperemesis; recent 8 day hospital admit for the same       CODE STATUS  Full Code    HPI & HOSPITAL COURSE  This is a 22 y.o. female here with hyperemesis gravidarum and UTI.     Ms. Spicer presents with 14 week pregnancy and recurrent N/V. She was last hospitalized 1/29/18.  Her N/V improved hydration and compazine with unisom. I had discussed with her OB Dr. Tilley in [place] who agreed with hydration.  She complained of dysuria with UA concerning for infection. Urine culture grew gardnerella vaginalis, also found on previous urine culture. She was started on macrobid and switched to complete course on amoxicillin.     The patient met 2-midnight criteria for an inpatient stay at the time of discharge.    Therefore, she is discharged in good and stable condition with close outpatient follow-up.    SPECIFIC OUTPATIENT FOLLOW-UP  OB, PCP    DISCHARGE PROBLEM LIST  Principal Problem (Resolved):    Hyperemesis gravidarum POA: Yes  Active Problems:    UTI (urinary tract infection) POA: Yes    Hypokalemia POA: Yes    Pregnant POA: Yes  Resolved Problems:    Metabolic acidosis POA: Yes    Microcytic anemia POA: Unknown    Hypomagnesemia POA: Unknown      FOLLOW UP  Future Appointments  Date Time Provider Department Center   4/11/2018 2:45 PM Juan Mathis M.D. [address]     No follow-up provider specified.    MEDICATIONS ON DISCHARGE   Nery Spicer   Home Medication Instructions ORACIO:[id number]    Printed on:02/18/18 1328   Medication Information                      amoxicillin (AMOXIL) 500 MG Cap  Take 1 Cap by mouth 2 times a day for 5 days.             doxylamine (UNISOM) 25 MG Tab tablet  Take 1 Tab by mouth at bedtime as needed (nausea).             metoclopramide (REGLAN) 5 MG tablet  Take 1 Tab by mouth 4 times a day as needed (nausea).             ondansetron (ZOFRAN ODT) 4 MG TABLET  DISPERSIBLE  Take 1 Tab by mouth every four hours as needed for Nausea (If Compazine does not help nausea).             potassium chloride SA (KDUR) 20 MEQ Tab CR  Take 2 Tabs by mouth every day for 3 days.             prochlorperazine (COMPAZINE) 5 MG Tab  Take 1 Tab by mouth every 6 hours as needed for Nausea/Vomiting.                                              pyridoxine (VITAMIN B-6) 50 MG Tab  Take 50 mg by mouth every day.                 DIET  Orders Placed This Encounter   Procedures   • Diet Order     Standing Status:   Standing     Number of Occurrences:   1     Order Specific Question:   Diet:     Answer:   Regular [1]       ACTIVITY  As tolerated.  Weight bearing as tolerated      CONSULTATIONS  None    PROCEDURES  None    LABORATORY  Lab Results   Component Value Date/Time    SODIUM 135 02/18/2018 02:20 AM    POTASSIUM 3.5 (L) 02/18/2018 02:20 AM    CHLORIDE 105 02/18/2018 02:20 AM    CO2 22 02/18/2018 02:20 AM    GLUCOSE 106 (H) 02/18/2018 02:20 AM    BUN 4 (L) 02/18/2018 02:20 AM    CREATININE 0.27 (L) 02/18/2018 02:20 AM        Lab Results   Component Value Date/Time    WBC 6.4 02/17/2018 03:20 AM    HEMOGLOBIN 12.9 02/17/2018 03:20 AM    HEMATOCRIT 41.0 02/17/2018 03:20 AM    PLATELETCT 193 02/17/2018 03:20 AM        Total time of the discharge process exceeds 40 minutes

## 2018-02-18 NOTE — CARE PLAN
Problem: Safety  Goal: Will remain free from injury  Outcome: PROGRESSING AS EXPECTED  Assessing how patient mobilizes, encouraging bed alarm, encouraging the use of call light    Problem: Bowel/Gastric:  Goal: Normal bowel function is maintained or improved  Outcome: PROGRESSING AS EXPECTED  Assessing need for a stool softener, nausea, or vomiting

## 2018-02-18 NOTE — PROGRESS NOTES
Given discharge instructions, able to verbalize understanding. Patient's ride will be here after mass.

## 2018-02-18 NOTE — PROGRESS NOTES
Late entry 2130. Received report/ assumed care at 1900. Pt is in bed. Denies pain at this time. Denies nausea or vomiting today. Ate dinner without puking. Up self and steady. Encouraged her to call if feeling dizzy at all. Encouraged fluids. Call light within reach and hourly rounding in place.

## 2018-02-18 NOTE — DISCHARGE INSTRUCTIONS
Discharge Instructions    Discharged to home by car with relative. Discharged via walking, hospital escort: Refused.  Special equipment needed: Not Applicable    Be sure to schedule a follow-up appointment with your primary care doctor or any specialists as instructed.     Discharge Plan:   Diet Plan: Discussed  Activity Level: Discussed  Confirmed Follow up Appointment: No (Comments)  Confirmed Symptoms Management: Discussed  Medication Reconciliation Updated: Yes  Influenza Vaccine Indication: Not indicated: Previously immunized this influenza season and > 8 years of age (when previously here per pt)    I understand that a diet low in cholesterol, fat, and sodium is recommended for good health. Unless I have been given specific instructions below for another diet, I accept this instruction as my diet prescription.   Other diet: REGULAR    Special Instructions: None    · Is patient discharged on Warfarin / Coumadin?   No     Hyperemesis Gravidarum  Hyperemesis gravidarum is a severe form of nausea and vomiting that happens during pregnancy. Hyperemesis is worse than morning sickness. It may cause you to have nausea or vomiting all day for many days. It may keep you from eating and drinking enough food and liquids. Hyperemesis usually occurs during the first half (the first 20 weeks) of pregnancy. It often goes away once a woman is in her second half of pregnancy. However, sometimes hyperemesis continues through an entire pregnancy.   CAUSES   The cause of this condition is not completely known but is thought to be related to changes in the body's hormones when pregnant. It could be from the high level of the pregnancy hormone or an increase in estrogen in the body.   SIGNS AND SYMPTOMS   · Severe nausea and vomiting.  · Nausea that does not go away.  · Vomiting that does not allow you to keep any food down.  · Weight loss and body fluid loss (dehydration).  · Having no desire to eat or not liking food you have  previously enjoyed.  DIAGNOSIS   Your health care provider will do a physical exam and ask you about your symptoms. He or she may also order blood tests and urine tests to make sure something else is not causing the problem.   TREATMENT   You may only need medicine to control the problem. If medicines do not control the nausea and vomiting, you will be treated in the hospital to prevent dehydration, increased acid in the blood (acidosis), weight loss, and changes in the electrolytes in your body that may harm the unborn baby (fetus). You may need IV fluids.   HOME CARE INSTRUCTIONS   · Only take over-the-counter or prescription medicines as directed by your health care provider.  · Try eating a couple of dry crackers or toast in the morning before getting out of bed.  · Avoid foods and smells that upset your stomach.  · Avoid fatty and spicy foods.  · Eat 5-6 small meals a day.  · Do not drink when eating meals. Drink between meals.  · For snacks, eat high-protein foods, such as cheese.  · Eat or suck on things that have geovanna in them. Geovanna helps nausea.  · Avoid food preparation. The smell of food can spoil your appetite.  · Avoid iron pills and iron in your multivitamins until after 3-4 months of being pregnant. However, consult with your health care provider before stopping any prescribed iron pills.  SEEK MEDICAL CARE IF:   · Your abdominal pain increases.  · You have a severe headache.  · You have vision problems.  · You are losing weight.  SEEK IMMEDIATE MEDICAL CARE IF:   · You are unable to keep fluids down.  · You vomit blood.  · You have constant nausea and vomiting.  · You have excessive weakness.  · You have extreme thirst.  · You have dizziness or fainting.  · You have a fever or persistent symptoms for more than 2-3 days.  · You have a fever and your symptoms suddenly get worse.  MAKE SURE YOU:   · Understand these instructions.  · Will watch your condition.  · Will get help right away if you are  not doing well or get worse.     This information is not intended to replace advice given to you by your health care provider. Make sure you discuss any questions you have with your health care provider.     Document Released: 12/18/2006 Document Revised: 10/08/2014 Document Reviewed: 07/30/2014  [employer] Interactive Patient Education ©2016 [employer]    Depression / Suicide Risk    As you are discharged from this [facility] facility, it is important to learn how to keep safe from harming yourself.    Recognize the warning signs:  · Abrupt changes in personality, positive or negative- including increase in energy   · Giving away possessions  · Change in eating patterns- significant weight changes-  positive or negative  · Change in sleeping patterns- unable to sleep or sleeping all the time   · Unwillingness or inability to communicate  · Depression  · Unusual sadness, discouragement and loneliness  · Talk of wanting to die  · Neglect of personal appearance   · Rebelliousness- reckless behavior  · Withdrawal from people/activities they love  · Confusion- inability to concentrate     If you or a loved one observes any of these behaviors or has concerns about self-harm, here's what you can do:  · Talk about it- your feelings and reasons for harming yourself  · Remove any means that you might use to hurt yourself (examples: pills, rope, extension cords, firearm)  · Get professional help from the community (Mental Health, Substance Abuse, psychological counseling)  · Do not be alone:Call your Safe Contact- someone whom you trust who will be there for you.  · Call your local CRISIS HOTLINE 473-2200 or 365-385-7713  · Call your local Children's Mobile Crisis Response Team [place] (075) 517-7956 or www.[employer]  · Call the toll free National Suicide Prevention Hotlines   · National Suicide Prevention Lifeline 806-974-QDBG (8755)  · National Hope Line Network 800-SUICIDE (438-4199)

## 2018-02-18 NOTE — PROGRESS NOTES
Patient resting comfortably in bed. She is aware she will be discharging today, reports her ride will be here this afternoon. She denies nausea at this time, informed to tell nurse if N/V occurs. In bed, call light in reach.

## 2018-03-05 ENCOUNTER — APPOINTMENT (OUTPATIENT)
Dept: INTERNAL MEDICINE | Facility: MEDICAL CENTER | Age: 23
End: 2018-03-05
Payer: COMMERCIAL

## 2018-03-12 NOTE — ADDENDUM NOTE
Encounter addended by: Zakia Mcclendon R.N. on: 3/12/2018  2:12 PM<BR>    Actions taken: Flowsheet accepted

## 2023-11-30 NOTE — CARE PLAN
Problem: Safety  Goal: Will remain free from falls  Pt declines the bed alarm.  Pt safety teaching reinforced       Home